# Patient Record
Sex: MALE | Race: OTHER | Employment: PART TIME | URBAN - METROPOLITAN AREA
[De-identification: names, ages, dates, MRNs, and addresses within clinical notes are randomized per-mention and may not be internally consistent; named-entity substitution may affect disease eponyms.]

---

## 2021-03-31 ENCOUNTER — APPOINTMENT (OUTPATIENT)
Dept: CT IMAGING | Age: 46
End: 2021-03-31
Payer: COMMERCIAL

## 2021-03-31 ENCOUNTER — HOSPITAL ENCOUNTER (EMERGENCY)
Age: 46
Discharge: HOME | End: 2021-03-31
Attending: EMERGENCY MEDICINE
Payer: COMMERCIAL

## 2021-03-31 ENCOUNTER — APPOINTMENT (OUTPATIENT)
Dept: GENERAL RADIOLOGY | Age: 46
End: 2021-03-31
Payer: COMMERCIAL

## 2021-03-31 VITALS
HEART RATE: 105 BPM | BODY MASS INDEX: 33.4 KG/M2 | OXYGEN SATURATION: 95 % | RESPIRATION RATE: 21 BRPM | DIASTOLIC BLOOD PRESSURE: 74 MMHG | SYSTOLIC BLOOD PRESSURE: 142 MMHG | WEIGHT: 220.4 LBS | TEMPERATURE: 99.3 F | HEIGHT: 68 IN

## 2021-03-31 DIAGNOSIS — K70.30 ALCOHOLIC CIRRHOSIS OF LIVER WITHOUT ASCITES (HCC): Primary | ICD-10-CM

## 2021-03-31 DIAGNOSIS — D69.6 THROMBOCYTOPENIA (HCC): ICD-10-CM

## 2021-03-31 DIAGNOSIS — S40.021A HEMATOMA OF ARM, RIGHT, INITIAL ENCOUNTER: ICD-10-CM

## 2021-03-31 DIAGNOSIS — K44.9 DIAPHRAGMATIC HERNIA WITHOUT OBSTRUCTION AND WITHOUT GANGRENE: ICD-10-CM

## 2021-03-31 LAB
A/G RATIO: 0.7 (ref 1.1–2.2)
ALBUMIN SERPL-MCNC: 3.4 G/DL (ref 3.4–5)
ALP BLD-CCNC: 220 U/L (ref 40–129)
ALT SERPL-CCNC: 25 U/L (ref 10–40)
ANION GAP SERPL CALCULATED.3IONS-SCNC: 14 MMOL/L (ref 3–16)
AST SERPL-CCNC: 274 U/L (ref 15–37)
BASOPHILS ABSOLUTE: 0.1 K/UL (ref 0–0.2)
BASOPHILS RELATIVE PERCENT: 1.1 %
BILIRUB SERPL-MCNC: 2.3 MG/DL (ref 0–1)
BUN BLDV-MCNC: 6 MG/DL (ref 7–20)
CALCIUM SERPL-MCNC: 8.3 MG/DL (ref 8.3–10.6)
CHLORIDE BLD-SCNC: 102 MMOL/L (ref 99–110)
CO2: 26 MMOL/L (ref 21–32)
CREAT SERPL-MCNC: <0.5 MG/DL (ref 0.9–1.3)
EKG ATRIAL RATE: 98 BPM
EKG DIAGNOSIS: NORMAL
EKG P AXIS: 22 DEGREES
EKG P-R INTERVAL: 154 MS
EKG Q-T INTERVAL: 376 MS
EKG QRS DURATION: 92 MS
EKG QTC CALCULATION (BAZETT): 480 MS
EKG R AXIS: -23 DEGREES
EKG T AXIS: 34 DEGREES
EKG VENTRICULAR RATE: 98 BPM
EOSINOPHILS ABSOLUTE: 0.3 K/UL (ref 0–0.6)
EOSINOPHILS RELATIVE PERCENT: 3.3 %
GFR AFRICAN AMERICAN: >60
GFR NON-AFRICAN AMERICAN: >60
GLOBULIN: 5.1 G/DL
GLUCOSE BLD-MCNC: 177 MG/DL (ref 70–99)
HCT VFR BLD CALC: 37.2 % (ref 40.5–52.5)
HEMOGLOBIN: 12.9 G/DL (ref 13.5–17.5)
LIPASE: 89 U/L (ref 13–60)
LYMPHOCYTES ABSOLUTE: 1.2 K/UL (ref 1–5.1)
LYMPHOCYTES RELATIVE PERCENT: 12.7 %
MAGNESIUM: 1.8 MG/DL (ref 1.8–2.4)
MCH RBC QN AUTO: 34.1 PG (ref 26–34)
MCHC RBC AUTO-ENTMCNC: 34.7 G/DL (ref 31–36)
MCV RBC AUTO: 98.2 FL (ref 80–100)
MONOCYTES ABSOLUTE: 0.7 K/UL (ref 0–1.3)
MONOCYTES RELATIVE PERCENT: 7.5 %
NEUTROPHILS ABSOLUTE: 6.8 K/UL (ref 1.7–7.7)
NEUTROPHILS RELATIVE PERCENT: 75.4 %
PDW BLD-RTO: 15.2 % (ref 12.4–15.4)
PLATELET # BLD: 68 K/UL (ref 135–450)
PLATELET SLIDE REVIEW: ABNORMAL
PMV BLD AUTO: 8.7 FL (ref 5–10.5)
POTASSIUM REFLEX MAGNESIUM: 3 MMOL/L (ref 3.5–5.1)
RBC # BLD: 3.79 M/UL (ref 4.2–5.9)
RBC # BLD: NORMAL 10*6/UL
SLIDE REVIEW: ABNORMAL
SODIUM BLD-SCNC: 142 MMOL/L (ref 136–145)
TOTAL PROTEIN: 8.5 G/DL (ref 6.4–8.2)
TROPONIN: <0.01 NG/ML
WBC # BLD: 9.1 K/UL (ref 4–11)

## 2021-03-31 PROCEDURE — 99283 EMERGENCY DEPT VISIT LOW MDM: CPT

## 2021-03-31 PROCEDURE — 93010 ELECTROCARDIOGRAM REPORT: CPT | Performed by: INTERNAL MEDICINE

## 2021-03-31 PROCEDURE — 83735 ASSAY OF MAGNESIUM: CPT

## 2021-03-31 PROCEDURE — 84484 ASSAY OF TROPONIN QUANT: CPT

## 2021-03-31 PROCEDURE — 6360000004 HC RX CONTRAST MEDICATION: Performed by: EMERGENCY MEDICINE

## 2021-03-31 PROCEDURE — 93005 ELECTROCARDIOGRAM TRACING: CPT | Performed by: EMERGENCY MEDICINE

## 2021-03-31 PROCEDURE — 6360000002 HC RX W HCPCS: Performed by: EMERGENCY MEDICINE

## 2021-03-31 PROCEDURE — 83690 ASSAY OF LIPASE: CPT

## 2021-03-31 PROCEDURE — 71045 X-RAY EXAM CHEST 1 VIEW: CPT

## 2021-03-31 PROCEDURE — 71260 CT THORAX DX C+: CPT

## 2021-03-31 PROCEDURE — 96374 THER/PROPH/DIAG INJ IV PUSH: CPT

## 2021-03-31 PROCEDURE — 80053 COMPREHEN METABOLIC PANEL: CPT

## 2021-03-31 PROCEDURE — 85025 COMPLETE CBC W/AUTO DIFF WBC: CPT

## 2021-03-31 RX ORDER — PROMETHAZINE HYDROCHLORIDE 25 MG/ML
12.5 INJECTION, SOLUTION INTRAMUSCULAR; INTRAVENOUS ONCE
Status: COMPLETED | OUTPATIENT
Start: 2021-03-31 | End: 2021-03-31

## 2021-03-31 RX ORDER — AMLODIPINE BESYLATE 10 MG/1
10 TABLET ORAL DAILY
Status: ON HOLD | COMMUNITY
End: 2022-02-19

## 2021-03-31 RX ORDER — LISINOPRIL 20 MG/1
20 TABLET ORAL DAILY
Status: ON HOLD | COMMUNITY
End: 2022-02-19 | Stop reason: ALTCHOICE

## 2021-03-31 RX ORDER — PROMETHAZINE HYDROCHLORIDE 25 MG/1
25 TABLET ORAL EVERY 6 HOURS PRN
Qty: 20 TABLET | Refills: 0 | Status: SHIPPED | OUTPATIENT
Start: 2021-03-31 | End: 2021-04-07

## 2021-03-31 RX ADMIN — IOPAMIDOL 80 ML: 755 INJECTION, SOLUTION INTRAVENOUS at 02:22

## 2021-03-31 RX ADMIN — PROMETHAZINE HYDROCHLORIDE 12.5 MG: 25 INJECTION INTRAMUSCULAR; INTRAVENOUS at 02:57

## 2021-03-31 ASSESSMENT — PAIN DESCRIPTION - FREQUENCY: FREQUENCY: CONTINUOUS

## 2021-03-31 ASSESSMENT — PAIN DESCRIPTION - LOCATION: LOCATION: CHEST

## 2021-03-31 NOTE — ED PROVIDER NOTES
[address]   eMERGENCY dEPARTMENT eNCOUnter      Pt Name: Roas Brar  MRN: [id number]  [employer] 1975  Date of evaluation: 3/31/2021  Provider: Michael Mcnally MD  PCP: [address] [place] [address]       Chief Complaint   Patient presents with    Chest Pain       HISTORY OFPRESENT ILLNESS   (Location/Symptom, Timing/Onset, Context/Setting, Quality, Duration, Modifying Factors,Severity)  Note limiting factors. Rosa Brar is a 51 y.o. male presents with complaints that for about 3 or 4 days he has had episodes of chest pain that is been constant he describes it as sharp and he gets short of breath with it he denies smoking he does have a history of high blood pressure he does not have diabetes he does not have high cholesterol he does have a family history of cerebrovascular disease in his family he denies any fever denies any pain with deep breath says he just has not been feeling well over the last couple of weeks feeling nauseated he does have a remote history of drinking when he was younger. He says the pain in his chest was sharp and on the left side earlier in the evening and it is much less so now    Nursing Notes were all reviewed and agreed with or any disagreements were addressed  in the HPI. REVIEW OF SYSTEMS    (2-9 systems for level 4, 10 or more for level 5)     Review of Systems    Positives and Pertinent negatives as per HPI. Except as noted above in the ROS, all other systems were reviewed andnegative. PASTMEDICAL HISTORY     Past Medical History:   Diagnosis Date    Hypertension          SURGICAL HISTORY     History reviewed. No pertinent surgical history. CURRENT MEDICATIONS       Previous Medications    AMLODIPINE (NORVASC) 10 MG TABLET    Take 10 mg by mouth daily    LISINOPRIL (PRINIVIL;ZESTRIL) 20 MG TABLET    Take 20 mg by mouth daily       ALLERGIES     Patient has no known allergies.     FAMILY HISTORY     History reviewed. No pertinent family history. SOCIAL HISTORY       Social History     Socioeconomic History    Marital status: Single     Spouse name: None    Number of children: None    Years of education: None    Highest education level: None   Occupational History    None   Social Needs    Financial resource strain: None    Food insecurity     Worry: None     Inability: None    Transportation needs     Medical: None     Non-medical: None   Tobacco Use    Smoking status: Former Smoker    Smokeless tobacco: Never Used   Substance and Sexual Activity    Alcohol use: Yes     Comment: rarely    Drug use: Yes     Types: Marijuana    Sexual activity: Yes     Partners: Female   Lifestyle    Physical activity     Days per week: None     Minutes per session: None    Stress: None   Relationships    Social connections     Talks on phone: None     Gets together: None     Attends Yazdanism service: None     Active member of club or organization: None     Attends meetings of clubs or organizations: None     Relationship status: None    Intimate partner violence     Fear of current or ex partner: None     Emotionally abused: None     Physically abused: None     Forced sexual activity: None   Other Topics Concern    None   Social History Narrative    None       SCREENINGS      @FLOW([id number])@      PHYSICAL EXAM    (up to 7 for level 4, 8 or more for level 5)     ED Triage Vitals [03/31/21 0041]   BP Temp Temp src Pulse Resp SpO2 Height Weight   (!) 141/82 -- -- 103 18 97 % 5' 8\" (1.727 m) 220 lb 6.4 oz (100 kg)       Physical Exam      General Appearance:  Alert, cooperative, no distress, appears stated age. Head:  Normocephalic, without obviousabnormality, atraumatic. Eyes:  conjunctiva/corneas clear, EOM's intact. Sclera anicteric. ENT: Mucous membranes moist.   Neck: Supple, symmetrical, trachea midline, no adenopathy. No jugular venous distention.      Lungs:   Clear to auscultation bilaterally, respirationsunlabored. No rales, rhonchi or wheezes. Chest Wall:  No tenderness. Heart:  Regular rate and rhythm, S1 and S2 normal, no murmur, rub or gallop. Abdomen:   Soft, non-tender, bowel sounds active,   no masses, no organomegaly. Extremities: No edema, cords or calf tenderness. Full range of motion. Pulses: 2+ and symmetric   Skin: Turgor is normal, no rashes or lesions. Neurologic: Alert and oriented X 3. No focal findings.   Motor grossly normal.  Speech clear, no drift, CN III-XII grossly intact,        DIAGNOSTIC RESULTS   LABS:    Labs Reviewed   CBC WITH AUTO DIFFERENTIAL - Abnormal; Notable for the following components:       Result Value    RBC 3.79 (*)     Hemoglobin 12.9 (*)     Hematocrit 37.2 (*)     MCH 34.1 (*)     Platelets 68 (*)     All other components within normal limits    Narrative:     Performed at:  [facility] [place]   Phone [phone]   COMPREHENSIVE METABOLIC PANEL W/ REFLEX TO MG FOR LOW K - Abnormal; Notable for the following components:    Potassium reflex Magnesium 3.0 (*)     Glucose 177 (*)     BUN 6 (*)     CREATININE <0.5 (*)     Total Protein 8.5 (*)     Albumin/Globulin Ratio 0.7 (*)     Total Bilirubin 2.3 (*)     Alkaline Phosphatase 220 (*)      (*)     All other components within normal limits    Narrative:     Performed at:  [address] [facility] [place]   Phone [phone]   LIPASE - Abnormal; Notable for the following components:    Lipase 89.0 (*)     All other components within normal limits    Narrative:     Performed at:  [facility]  [address]   Phone [phone]   TROPONIN    Narrative:     Performed at:  [address] [facility] [place]   Phone [phone]   MAGNESIUM Narrative:     Performed at:  [facility]) [facility]  [address]   Phone [phone]   URINE RT REFLEX TO CULTURE       All other labs were within normal range or not returned as of this dictation. EKG: All EKG's are interpreted by the Emergency Department Physician who eithersigns or Co-signs this chart in the absence of a cardiologist.    The Ekg interpreted by me in the absence of a cardiologist shows. Normal Sinus rhythm   Rate of   98  Axis is   Normal  QTc is  480  Intervals and Durations are unremarkable. Nonspecific ST-T wave changes appreciated. No evidence of acute ischemia. RADIOLOGY:   Non-plain film images such as CT, Ultrasound and MRI are read by the radiologist. Plain radiographic images are visualized by myself. *    Interpretation per the Radiologist below, if available at the time of this note:    CT CHEST ABDOMEN PELVIS W CONTRAST   Final Result   1. Left diaphragmatic hernia containing fat/ omentum. 2.  Suboptimal enhancement of the pulmonary arteries. Cannot assess for    pulmonary emboli/filling defects.       _______________________________________________       IMPRESSION:          Heterogeneous, fatty and nodular cirrhotic liver with numerous too    small to characterize low-attenuation foci. XR CHEST PORTABLE   Final Result     Eventration of the left diaphragm versus opacity/lesion left lower    hemithorax.                 PROCEDURES   Unless otherwise noted below, none     Procedures    *    CRITICAL CARE TIME   N/A      EMERGENCY DEPARTMENT COURSE and DIFFERENTIALDIAGNOSIS/MDM:   Vitals:    Vitals:    03/31/21 0041 03/31/21 0100 03/31/21 0130 03/31/21 0132   BP: (!) 141/82 129/76 136/86    Pulse: 103   99   Resp: 18      Temp: 99.3 °F (37.4 °C)      TempSrc: Oral      SpO2: 97% 93% 97%    Weight: 220 lb 6.4 oz (100 kg)      Height: 5' 8\" (1.727 m)          Patient was given thefollowing medications:  Medications   iopamidol (ISOVUE-370) 76 % injection 80 mL (80 mLs Intravenous Given 3/31/21 0222)   promethazine (PHENERGAN) injection 12.5 mg (12.5 mg Intravenous Given 3/31/21 0257)           The patient tolerated their visit well. The patient and / or the familywere informed of the results of any tests, a time was given to answer questions. FINAL IMPRESSION      1. Alcoholic cirrhosis of liver without ascites ([facility] [address]    2. Thrombocytopenia ([facility] [address]    3. Diaphragmatic hernia without obstruction and without gangrene          DISPOSITION/PLAN   DISPOSITION Decision To Discharge 03/31/2021 03:21:59 AM    The patient had a IV established in the right forearm and initially upon dosing for his CT scan there was infiltration of the IV with some swelling warm compress pressure compress was applied the patient was not having any pain    I reviewed the results with the patient and told him that he does have signs of cirrhosis on his liver he does have a diaphragmatic hernia without any rupture and he does have some thrombocytopenia though not critically so at a level of 68,000 he will follow up with his family doctor Dr. Du Beach for further testing I feel comfortable this patient does not have a life-threatening cause of chest pain    The patient presents with chest pain. The patient has been evaluated and the history and physical exam suggest a benign etiology. I see nothing to suggest coronary ischemia, myocardial infarction, pulmonary embolism, thoracic aortic dissection, significant pericarditis, pneumonia, pneumothorax, or acute abdomen. I feel the patient can be safely discharged to home with outpatient follow up. Instructions have been given for the patient to return to the ED for any worsening of the symptoms, including but not limited to increased pain, shortness of breath, abdominal pain or weakness.     PATIENT REFERRED TO:  [facility]  [address] [facility] [address] [phone]    In 2 days        DISCHARGE MEDICATIONS:  New Prescriptions    PROMETHAZINE (PHENERGAN) 25 MG TABLET    Take 1 tablet by mouth every 6 hours as needed for Nausea WARNING:  May cause drowsiness. May impair ability to operate vehicles or machinery. Do not use in combination with alcohol.        DISCONTINUED MEDICATIONS:  Discontinued Medications    No medications on file              (Please note that portions of this note were completed with a voice recognition program.  Efforts were made to edit the dictations but occasionally words are mis-transcribed.)    Lizzeth Abbasi MD (electronically signed)      Lizzeth Abbasi MD  03/31/21 9414

## 2021-03-31 NOTE — ED NOTES
IV site in rt arm infiltrated in CT site hard and swollen warm compress applied and arm elevated     Emigdio Diaz, RN  03/31/21 0071

## 2021-03-31 NOTE — ED TRIAGE NOTES
Pt states he has had lt sided chest pain he points to his left side below his nipple and says it is tender to touch there states this has been going on for about 3 days but got worse tonight

## 2021-03-31 NOTE — ED NOTES
Patient prepared for and ready to be discharged. Patient discharged at this time in no acute distress after verbalizing understanding of discharge instructions. Patient left after receiving After Visit Summary instructions.         Soham Garcia RN  03/31/21 0962

## 2021-12-22 ENCOUNTER — APPOINTMENT (OUTPATIENT)
Dept: GENERAL RADIOLOGY | Age: 46
End: 2021-12-22
Payer: COMMERCIAL

## 2021-12-22 ENCOUNTER — HOSPITAL ENCOUNTER (EMERGENCY)
Age: 46
Discharge: HOME | End: 2021-12-22
Attending: EMERGENCY MEDICINE
Payer: COMMERCIAL

## 2021-12-22 VITALS
HEART RATE: 70 BPM | WEIGHT: 195.2 LBS | SYSTOLIC BLOOD PRESSURE: 133 MMHG | HEIGHT: 68 IN | TEMPERATURE: 98.5 F | RESPIRATION RATE: 20 BRPM | BODY MASS INDEX: 29.58 KG/M2 | DIASTOLIC BLOOD PRESSURE: 68 MMHG | OXYGEN SATURATION: 99 %

## 2021-12-22 DIAGNOSIS — D69.6 THROMBOCYTOPENIA (HCC): ICD-10-CM

## 2021-12-22 DIAGNOSIS — K70.30 ALCOHOLIC CIRRHOSIS, UNSPECIFIED WHETHER ASCITES PRESENT (HCC): ICD-10-CM

## 2021-12-22 DIAGNOSIS — R23.3 EASY BRUISING: Primary | ICD-10-CM

## 2021-12-22 DIAGNOSIS — E80.6 HYPERBILIRUBINEMIA: ICD-10-CM

## 2021-12-22 LAB
A/G RATIO: 0.7 (ref 1.1–2.2)
ALBUMIN SERPL-MCNC: 3.3 G/DL (ref 3.4–5)
ALP BLD-CCNC: 226 U/L (ref 40–129)
ALT SERPL-CCNC: 39 U/L (ref 10–40)
ANION GAP SERPL CALCULATED.3IONS-SCNC: 12 MMOL/L (ref 3–16)
APTT: 51.5 SEC (ref 26.2–38.6)
AST SERPL-CCNC: 247 U/L (ref 15–37)
BASOPHILS ABSOLUTE: 0 K/UL (ref 0–0.2)
BASOPHILS RELATIVE PERCENT: 0.6 %
BILIRUB SERPL-MCNC: 10.1 MG/DL (ref 0–1)
BUN BLDV-MCNC: 4 MG/DL (ref 7–20)
CALCIUM SERPL-MCNC: 8.9 MG/DL (ref 8.3–10.6)
CHLORIDE BLD-SCNC: 106 MMOL/L (ref 99–110)
CO2: 21 MMOL/L (ref 21–32)
CREAT SERPL-MCNC: <0.5 MG/DL (ref 0.9–1.3)
EOSINOPHILS ABSOLUTE: 0.2 K/UL (ref 0–0.6)
EOSINOPHILS RELATIVE PERCENT: 3.8 %
ETHANOL: 103 MG/DL (ref 0–0.08)
GFR AFRICAN AMERICAN: >60
GFR NON-AFRICAN AMERICAN: >60
GLUCOSE BLD-MCNC: 152 MG/DL (ref 70–99)
HCT VFR BLD CALC: 32.5 % (ref 40.5–52.5)
HEMOGLOBIN: 11.5 G/DL (ref 13.5–17.5)
INR BLD: 2.22 (ref 0.88–1.12)
LYMPHOCYTES ABSOLUTE: 1.2 K/UL (ref 1–5.1)
LYMPHOCYTES RELATIVE PERCENT: 19.4 %
MCH RBC QN AUTO: 37.4 PG (ref 26–34)
MCHC RBC AUTO-ENTMCNC: 35.5 G/DL (ref 31–36)
MCV RBC AUTO: 105.2 FL (ref 80–100)
MONOCYTES ABSOLUTE: 0.5 K/UL (ref 0–1.3)
MONOCYTES RELATIVE PERCENT: 8.4 %
NEUTROPHILS ABSOLUTE: 4.1 K/UL (ref 1.7–7.7)
NEUTROPHILS RELATIVE PERCENT: 67.8 %
PDW BLD-RTO: 14.8 % (ref 12.4–15.4)
PLATELET # BLD: 75 K/UL (ref 135–450)
PMV BLD AUTO: 9.3 FL (ref 5–10.5)
POTASSIUM REFLEX MAGNESIUM: 3.9 MMOL/L (ref 3.5–5.1)
PROTHROMBIN TIME: 25.9 SEC (ref 9.9–12.7)
RBC # BLD: 3.09 M/UL (ref 4.2–5.9)
SODIUM BLD-SCNC: 139 MMOL/L (ref 136–145)
TOTAL PROTEIN: 7.9 G/DL (ref 6.4–8.2)
TROPONIN: <0.01 NG/ML
WBC # BLD: 6 K/UL (ref 4–11)

## 2021-12-22 PROCEDURE — 99284 EMERGENCY DEPT VISIT MOD MDM: CPT

## 2021-12-22 PROCEDURE — 85610 PROTHROMBIN TIME: CPT

## 2021-12-22 PROCEDURE — 84484 ASSAY OF TROPONIN QUANT: CPT

## 2021-12-22 PROCEDURE — 82077 ASSAY SPEC XCP UR&BREATH IA: CPT

## 2021-12-22 PROCEDURE — 85025 COMPLETE CBC W/AUTO DIFF WBC: CPT

## 2021-12-22 PROCEDURE — 71046 X-RAY EXAM CHEST 2 VIEWS: CPT

## 2021-12-22 PROCEDURE — 93005 ELECTROCARDIOGRAM TRACING: CPT | Performed by: EMERGENCY MEDICINE

## 2021-12-22 PROCEDURE — 85730 THROMBOPLASTIN TIME PARTIAL: CPT

## 2021-12-22 PROCEDURE — 80053 COMPREHEN METABOLIC PANEL: CPT

## 2021-12-22 ASSESSMENT — PAIN DESCRIPTION - LOCATION: LOCATION: ARM

## 2021-12-22 ASSESSMENT — PAIN DESCRIPTION - FREQUENCY: FREQUENCY: CONTINUOUS

## 2021-12-22 ASSESSMENT — PAIN SCALES - GENERAL: PAINLEVEL_OUTOF10: 8

## 2021-12-22 ASSESSMENT — PAIN DESCRIPTION - ORIENTATION: ORIENTATION: RIGHT

## 2021-12-22 ASSESSMENT — PAIN DESCRIPTION - PAIN TYPE: TYPE: ACUTE PAIN

## 2021-12-22 ASSESSMENT — PAIN DESCRIPTION - DESCRIPTORS: DESCRIPTORS: SHARP;NUMBNESS;TINGLING

## 2021-12-22 NOTE — ED PROVIDER NOTES
[address] [facility]    CHIEF COMPLAINT  Chest Pain and Arm Pain (bilateral)       HISTORY OF PRESENT ILLNESS  Batool Newell is a 51 y.o. male with PMH alcoholic cirrhosis, and as below, who presents to the ED with complaint of a bruising of the right forearm. Patient attempted to be seen by PCP and by UC but was unable to be seen. The R forearm bruising started yesterday morning. No clear precipitating factor. No trauma. Patient denies antiplatelet or anticoagulating agents. The patient was seen by his PCP on 11/19/2021 for bruising of the left arm and chest. He underwent bloodwork at that time that was notable for thrombocytopenia, a chronic problem for the patient. Tbili 11.4 (Direct bili 4.7); INR 1.7; PTT 17.2. Patient drinking 1 beer / day. The patient complains of occasional chest pain. Last episode occurred \"sometime last week\". Lasted for only 20-30 sec. Poorly characterized. Occasional SOB. Denies nausea, vomiting, diarrhea. I note visit with gastroenterology Dr. Ramiro Lipscomb 6/28/2021 with diagnosis of liver cirrhosis with alcoholic liver cirrhosis favored most likely diagnosis. Signs of portal hypertension seen on CT scan. Recommendations at that time for EGD for evaluation of diarrhea and black tarry stools. Recommendations for referral to Dr. Rhonda Santillan or Dr. Xavier Alejandro  / Victor Hugo Julio and patient advised to follow up for EGD and office visit. Patient has not yet followed with either of these individuals but states he has an appointment with Dr. Xavier Alejandro. No other complaints, modifying factors or associated symptoms. I have reviewed the following from the nursing documentation:    Past Medical History:   Diagnosis Date    Hypertension      History reviewed. No pertinent surgical history. History reviewed. No pertinent family history.   Social History     Socioeconomic History    Marital status: Single     Spouse name: Not on file    Number of children: Not on file  Years of education: Not on file    Highest education level: Not on file   Occupational History    Not on file   Tobacco Use    Smoking status: Former Smoker    Smokeless tobacco: Never Used   Substance and Sexual Activity    Alcohol use: Not Currently    Drug use: Yes     Types: Marijuana Laura Serrano)    Sexual activity: Yes     Partners: Female   Other Topics Concern    Not on file   Social History Narrative    Not on file     Social Determinants of Health     Financial Resource Strain:     Difficulty of Paying Living Expenses: Not on file   Food Insecurity:     Worried About Running Out of Food in the Last Year: Not on file    Felix of Food in the Last Year: Not on file   Transportation Needs:     Lack of Transportation (Medical): Not on file    Lack of Transportation (Non-Medical): Not on file   Physical Activity:     Days of Exercise per Week: Not on file    Minutes of Exercise per Session: Not on file   Stress:     Feeling of Stress : Not on file   Social Connections:     Frequency of Communication with Friends and Family: Not on file    Frequency of Social Gatherings with Friends and Family: Not on file    Attends Zoroastrianism Services: Not on file    Active Member of [address] [place] [address] or Organizations: Not on file    Attends Club or Organization Meetings: Not on file    Marital Status: Not on file   Intimate Partner Violence:     Fear of Current or Ex-Partner: Not on file    Emotionally Abused: Not on file    Physically Abused: Not on file    Sexually Abused: Not on file   Housing Stability:     Unable to Pay for Housing in the Last Year: Not on file    Number of [place] in the Last Year: Not on file    Unstable Housing in the Last Year: Not on file     No current facility-administered medications for this encounter.      Current Outpatient Medications   Medication Sig Dispense Refill    lisinopril (PRINIVIL;ZESTRIL) 20 MG tablet Take 20 mg by mouth daily      amLODIPine (NORVASC) 10 MG tablet Take 10 mg by mouth daily       No Known Allergies    REVIEW OF SYSTEMS  10 systems reviewed, pertinent positives and negatives per HPI, otherwise noted to be negative. PHYSICAL EXAM  ED Triage Vitals [12/22/21 1730]   Enc Vitals Group      /71      Pulse 65      Resp 20      Temp 98.5 °F (36.9 °C)      Temp Source Oral      SpO2 98 %      Weight 195 lb 3.2 oz (88.5 kg)      Height 5' 8\" (1.727 m)      Head Circumference       Peak Flow       Pain Score       Pain Loc       Pain Edu? Excl. in [address]? General appearance: Awake and alert. Cooperative. No acute distress. HENT: Normocephalic. Atraumatic. Mucous membranes are moist.  Neck: Supple. Eyes: PERRL. EOMI. Heart/Chest: RRR. No murmurs. Lungs: Respirations unlabored. CTAB. Good air exchange. Speaking comfortably in full sentences. Abdomen: Soft. Non-tender. Non-distended. No rebound or guarding. Musculoskeletal: There is bruising of the anterior aspect of the right forearm. Otherwise no deformity. No tenderness in the extremities. All extremities neurovascularly intact. Skin: Warm and dry. No acute rashes. Neurological: Alert and oriented. CN II-XII intact. Strength 5/5 bilateral upper and lower extremities. Sensation intact to light touch. Gait normal.  Psychiatric: Mood/affect: normal      LABS  I have reviewed all labs for this visit.    Results for orders placed or performed during the hospital encounter of 12/22/21   CBC Auto Differential   Result Value Ref Range    WBC 6.0 4.0 - 11.0 K/uL    RBC 3.09 (L) 4.20 - 5.90 M/uL    Hemoglobin 11.5 (L) 13.5 - 17.5 g/dL    Hematocrit 32.5 (L) 40.5 - 52.5 %    .2 (H) 80.0 - 100.0 fL    MCH 37.4 (H) 26.0 - 34.0 pg    MCHC 35.5 31.0 - 36.0 g/dL    RDW 14.8 12.4 - 15.4 %    Platelets 75 (L) 960 - 450 K/uL    MPV 9.3 5.0 - 10.5 fL    Neutrophils % 67.8 %    Lymphocytes % 19.4 %    Monocytes % 8.4 %    Eosinophils % 3.8 %    Basophils % 0.6 %    Neutrophils Absolute 4.1 1.7 - 7.7 K/uL    Lymphocytes Absolute 1.2 1.0 - 5.1 K/uL    Monocytes Absolute 0.5 0.0 - 1.3 K/uL    Eosinophils Absolute 0.2 0.0 - 0.6 K/uL    Basophils Absolute 0.0 0.0 - 0.2 K/uL   Comprehensive Metabolic Panel w/ Reflex to MG   Result Value Ref Range    Sodium 139 136 - 145 mmol/L    Potassium reflex Magnesium 3.9 3.5 - 5.1 mmol/L    Chloride 106 99 - 110 mmol/L    CO2 21 21 - 32 mmol/L    Anion Gap 12 3 - 16    Glucose 152 (H) 70 - 99 mg/dL    BUN 4 (L) 7 - 20 mg/dL    CREATININE <0.5 (L) 0.9 - 1.3 mg/dL    GFR Non-African American >60 >60    GFR African American >60 >60    Calcium 8.9 8.3 - 10.6 mg/dL    Total Protein 7.9 6.4 - 8.2 g/dL    Albumin 3.3 (L) 3.4 - 5.0 g/dL    Albumin/Globulin Ratio 0.7 (L) 1.1 - 2.2    Total Bilirubin 10.1 (H) 0.0 - 1.0 mg/dL    Alkaline Phosphatase 226 (H) 40 - 129 U/L    ALT 39 10 - 40 U/L     (H) 15 - 37 U/L   Troponin   Result Value Ref Range    Troponin <0.01 <0.01 ng/mL   Protime-INR   Result Value Ref Range    Protime 25.9 (H) 9.9 - 12.7 sec    INR 2.22 (H) 0.88 - 1.12   APTT   Result Value Ref Range    aPTT 51.5 (H) 26.2 - 38.6 sec   Ethanol   Result Value Ref Range    Ethanol Lvl 103 mg/dL   EKG 12 Lead   Result Value Ref Range    Ventricular Rate 66 BPM    Atrial Rate 66 BPM    P-R Interval 148 ms    QRS Duration 98 ms    Q-T Interval 448 ms    QTc Calculation (Bazett) 469 ms    P Axis 19 degrees    R Axis -26 degrees    T Axis 28 degrees    Diagnosis Normal sinus rhythmNormal ECG        RADIOLOGY  I have reviewed all radiographic studies for this visit. XR CHEST (2 VW)   Final Result      No acute pulmonary disease. ECG  EKG interpreted by myself. Rate: normal  Rhythm: NSR  Axis: normal  Intervals: within normal limits  ST Segments: no acute abnormality  T waves: no acute abnormality  Comparison: Compared to 3/31/21, QTc narrowed by 11 ms  Impression: NSR with no acute abnormality       ED COURSE/MDM  Patient seen and evaluated.  Old records reviewed. Labs and imaging reviewed and results discussed with patient/family to extent possible. This is a 21-year-old male who presents with complaint of easy bruising. He has previously been evaluated for this by his PCP including laboratory studies that showed evidence of synthetic liver dysfunction. Likely related to his history of alcoholic cirrhosis. He is followed with gastroenterology in the outpatient setting and has an upcoming appointment with hepatology specifically. CBC shows thrombocytopenia platelets 75. INR and PTT are both abnormal.  The patient did complain of an atypical chest pain. EKG shows no acute ischemic abnormality. Troponin within normal limits. Chest x-ray nothing acute. Low suspicion for ACS. In the absence of tachycardia, tachypnea, hypoxia, or clinical signs/symptoms of DVT, low suspicion for pulmonary embolism. HEART SCORE:    History: +0 for low suspicion  EKG: +0 for normal EKG   Age: +1 for age 44-72 years  Risk factors (includes HLD, HTN, DM, tobacco use, obesity, and +FHx): +2 for known CAD or 3+ risk factors  Initial troponin: +0 for negative troponin    Heart score: 3. This falls under the following category: Score of 0-3, which indicates a very low risk for major adverse cardiac event and supports early discharge    Pt given strict guidance to abstain from EtOH. States only drinks 1 beer daily but EtOH level here > 100. Close f/u with Hepatology in the outpatient setting. F/u with PCP in several days. Usual strict return precautions for new or worsening symptoms communicated. All questions were answered and the patient/family expressed understanding and agreement with the plan. PROCEDURES  None    CRITICAL CARE  N/A    CLINICAL IMPRESSION  1. Easy bruising    2. Thrombocytopenia ([address]    3. Alcoholic cirrhosis, unspecified whether ascites present ([address]    4.  Hyperbilirubinemia        DISPOSITION   discharge     Anne Smith MD    Note: This chart was created using voice recognition dictation software. Efforts were made by me to ensure accuracy, however some errors may be present due to limitations of this technology and occasionally words are not transcribed correctly.         Tyson Cantor MD  12/23/21 6441

## 2021-12-22 NOTE — ED TRIAGE NOTES
51y/o male presents to the ED with bilateral arm bruising and pain, R>L. +swelling to rt arm. +severe pain. +c/p +sob.  +nausea

## 2021-12-23 LAB
EKG ATRIAL RATE: 66 BPM
EKG DIAGNOSIS: NORMAL
EKG P AXIS: 19 DEGREES
EKG P-R INTERVAL: 148 MS
EKG Q-T INTERVAL: 448 MS
EKG QRS DURATION: 98 MS
EKG QTC CALCULATION (BAZETT): 469 MS
EKG R AXIS: -26 DEGREES
EKG T AXIS: 28 DEGREES
EKG VENTRICULAR RATE: 66 BPM

## 2021-12-23 PROCEDURE — 93010 ELECTROCARDIOGRAM REPORT: CPT | Performed by: INTERNAL MEDICINE

## 2022-02-18 ENCOUNTER — HOSPITAL ENCOUNTER (INPATIENT)
Age: 47
LOS: 4 days | Discharge: HOSPICE | DRG: 661 | End: 2022-02-22
Attending: EMERGENCY MEDICINE | Admitting: INTERNAL MEDICINE
Payer: COMMERCIAL

## 2022-02-18 ENCOUNTER — APPOINTMENT (OUTPATIENT)
Dept: CT IMAGING | Age: 47
DRG: 661 | End: 2022-02-18
Payer: COMMERCIAL

## 2022-02-18 DIAGNOSIS — R17 ELEVATED BILIRUBIN: ICD-10-CM

## 2022-02-18 DIAGNOSIS — E11.9 DIABETES MELLITUS, NEW ONSET (HCC): ICD-10-CM

## 2022-02-18 DIAGNOSIS — R58 ECCHYMOSIS: ICD-10-CM

## 2022-02-18 DIAGNOSIS — D64.9 ANEMIA, UNSPECIFIED TYPE: Primary | ICD-10-CM

## 2022-02-18 DIAGNOSIS — D69.6 THROMBOCYTOPENIA (HCC): ICD-10-CM

## 2022-02-18 DIAGNOSIS — K70.30 ALCOHOLIC CIRRHOSIS, UNSPECIFIED WHETHER ASCITES PRESENT (HCC): ICD-10-CM

## 2022-02-18 LAB
A/G RATIO: 0.9 (ref 1.1–2.2)
ABO/RH: NORMAL
ALBUMIN SERPL-MCNC: 3 G/DL (ref 3.4–5)
ALP BLD-CCNC: 139 U/L (ref 40–129)
ALT SERPL-CCNC: 54 U/L (ref 10–40)
AMMONIA: 65 UMOL/L (ref 16–60)
ANION GAP SERPL CALCULATED.3IONS-SCNC: 13 MMOL/L (ref 3–16)
ANISOCYTOSIS: ABNORMAL
ANTIBODY SCREEN: NORMAL
AST SERPL-CCNC: 164 U/L (ref 15–37)
BASOPHILS ABSOLUTE: 0 K/UL (ref 0–0.2)
BASOPHILS RELATIVE PERCENT: 0.2 %
BILIRUB SERPL-MCNC: 26.5 MG/DL (ref 0–1)
BILIRUBIN URINE: ABNORMAL
BLOOD, URINE: ABNORMAL
BUN BLDV-MCNC: 14 MG/DL (ref 7–20)
BURR CELLS: ABNORMAL
CALCIUM SERPL-MCNC: 8.8 MG/DL (ref 8.3–10.6)
CHLORIDE BLD-SCNC: 101 MMOL/L (ref 99–110)
CLARITY: CLEAR
CO2: 19 MMOL/L (ref 21–32)
COLOR: YELLOW
CREAT SERPL-MCNC: <0.5 MG/DL (ref 0.9–1.3)
CRENATED RBC'S: ABNORMAL
EOSINOPHILS ABSOLUTE: 0 K/UL (ref 0–0.6)
EOSINOPHILS RELATIVE PERCENT: 0.2 %
EPITHELIAL CELLS, UA: NORMAL /HPF (ref 0–5)
ETHANOL: NORMAL MG/DL (ref 0–0.08)
GFR AFRICAN AMERICAN: >60
GFR NON-AFRICAN AMERICAN: >60
GLUCOSE BLD-MCNC: 392 MG/DL (ref 70–99)
GLUCOSE URINE: >=1000 MG/DL
HCT VFR BLD CALC: 20.1 % (ref 40.5–52.5)
HEMATOLOGY PATH CONSULT: YES
HEMOGLOBIN: 6.9 G/DL (ref 13.5–17.5)
HYPOCHROMIA: ABNORMAL
INR BLD: 4.18 (ref 0.88–1.12)
KETONES, URINE: NEGATIVE MG/DL
LEUKOCYTE ESTERASE, URINE: NEGATIVE
LIPASE: 105 U/L (ref 13–60)
LYMPHOCYTES ABSOLUTE: 0.5 K/UL (ref 1–5.1)
LYMPHOCYTES RELATIVE PERCENT: 3.7 %
MAGNESIUM: 1.9 MG/DL (ref 1.8–2.4)
MCH RBC QN AUTO: 36.1 PG (ref 26–34)
MCHC RBC AUTO-ENTMCNC: 34.4 G/DL (ref 31–36)
MCV RBC AUTO: 104.8 FL (ref 80–100)
MICROSCOPIC EXAMINATION: YES
MONOCYTES ABSOLUTE: 1.1 K/UL (ref 0–1.3)
MONOCYTES RELATIVE PERCENT: 9.3 %
NEUTROPHILS ABSOLUTE: 10.6 K/UL (ref 1.7–7.7)
NEUTROPHILS RELATIVE PERCENT: 86.6 %
NITRITE, URINE: NEGATIVE
PDW BLD-RTO: 25.8 % (ref 12.4–15.4)
PH UA: 6 (ref 5–8)
PLATELET # BLD: 47 K/UL (ref 135–450)
PLATELET SLIDE REVIEW: ABNORMAL
PMV BLD AUTO: 8.7 FL (ref 5–10.5)
POIKILOCYTES: ABNORMAL
POLYCHROMASIA: ABNORMAL
POTASSIUM SERPL-SCNC: 4.8 MMOL/L (ref 3.5–5.1)
PRO-BNP: 83 PG/ML (ref 0–124)
PROTEIN UA: NEGATIVE MG/DL
PROTHROMBIN TIME: 50.1 SEC (ref 9.9–12.7)
RBC # BLD: 1.91 M/UL (ref 4.2–5.9)
RBC UA: NORMAL /HPF (ref 0–4)
SCHISTOCYTES: ABNORMAL
SLIDE REVIEW: ABNORMAL
SODIUM BLD-SCNC: 133 MMOL/L (ref 136–145)
SPECIFIC GRAVITY UA: 1.01 (ref 1–1.03)
TOTAL PROTEIN: 6.4 G/DL (ref 6.4–8.2)
TOXIC GRANULATION: PRESENT
TROPONIN: <0.01 NG/ML
URINE TYPE: ABNORMAL
UROBILINOGEN, URINE: 0.2 E.U./DL
WBC # BLD: 12.3 K/UL (ref 4–11)
WBC UA: NORMAL /HPF (ref 0–5)

## 2022-02-18 PROCEDURE — 83735 ASSAY OF MAGNESIUM: CPT

## 2022-02-18 PROCEDURE — 6360000004 HC RX CONTRAST MEDICATION: Performed by: EMERGENCY MEDICINE

## 2022-02-18 PROCEDURE — 86850 RBC ANTIBODY SCREEN: CPT

## 2022-02-18 PROCEDURE — 36430 TRANSFUSION BLD/BLD COMPNT: CPT

## 2022-02-18 PROCEDURE — P9017 PLASMA 1 DONOR FRZ W/IN 8 HR: HCPCS

## 2022-02-18 PROCEDURE — 96374 THER/PROPH/DIAG INJ IV PUSH: CPT

## 2022-02-18 PROCEDURE — 81001 URINALYSIS AUTO W/SCOPE: CPT

## 2022-02-18 PROCEDURE — 99284 EMERGENCY DEPT VISIT MOD MDM: CPT

## 2022-02-18 PROCEDURE — 83036 HEMOGLOBIN GLYCOSYLATED A1C: CPT

## 2022-02-18 PROCEDURE — 85025 COMPLETE CBC W/AUTO DIFF WBC: CPT

## 2022-02-18 PROCEDURE — P9035 PLATELET PHERES LEUKOREDUCED: HCPCS

## 2022-02-18 PROCEDURE — 86900 BLOOD TYPING SEROLOGIC ABO: CPT

## 2022-02-18 PROCEDURE — 82140 ASSAY OF AMMONIA: CPT

## 2022-02-18 PROCEDURE — 86923 COMPATIBILITY TEST ELECTRIC: CPT

## 2022-02-18 PROCEDURE — 80053 COMPREHEN METABOLIC PANEL: CPT

## 2022-02-18 PROCEDURE — P9016 RBC LEUKOCYTES REDUCED: HCPCS

## 2022-02-18 PROCEDURE — 86901 BLOOD TYPING SEROLOGIC RH(D): CPT

## 2022-02-18 PROCEDURE — 6360000002 HC RX W HCPCS: Performed by: EMERGENCY MEDICINE

## 2022-02-18 PROCEDURE — 2580000003 HC RX 258: Performed by: EMERGENCY MEDICINE

## 2022-02-18 PROCEDURE — 71260 CT THORAX DX C+: CPT

## 2022-02-18 PROCEDURE — 96372 THER/PROPH/DIAG INJ SC/IM: CPT

## 2022-02-18 PROCEDURE — 84484 ASSAY OF TROPONIN QUANT: CPT

## 2022-02-18 PROCEDURE — 82077 ASSAY SPEC XCP UR&BREATH IA: CPT

## 2022-02-18 PROCEDURE — 93005 ELECTROCARDIOGRAM TRACING: CPT | Performed by: EMERGENCY MEDICINE

## 2022-02-18 PROCEDURE — 96375 TX/PRO/DX INJ NEW DRUG ADDON: CPT

## 2022-02-18 PROCEDURE — 1200000000 HC SEMI PRIVATE

## 2022-02-18 PROCEDURE — 83690 ASSAY OF LIPASE: CPT

## 2022-02-18 PROCEDURE — 85610 PROTHROMBIN TIME: CPT

## 2022-02-18 PROCEDURE — 6360000002 HC RX W HCPCS: Performed by: NURSE PRACTITIONER

## 2022-02-18 PROCEDURE — 83880 ASSAY OF NATRIURETIC PEPTIDE: CPT

## 2022-02-18 RX ORDER — SODIUM CHLORIDE 9 MG/ML
INJECTION, SOLUTION INTRAVENOUS PRN
Status: DISCONTINUED | OUTPATIENT
Start: 2022-02-18 | End: 2022-02-22 | Stop reason: HOSPADM

## 2022-02-18 RX ORDER — 0.9 % SODIUM CHLORIDE 0.9 %
500 INTRAVENOUS SOLUTION INTRAVENOUS ONCE
Status: COMPLETED | OUTPATIENT
Start: 2022-02-18 | End: 2022-02-18

## 2022-02-18 RX ORDER — PHYTONADIONE 10 MG/ML
10 INJECTION, EMULSION INTRAMUSCULAR; INTRAVENOUS; SUBCUTANEOUS ONCE
Status: COMPLETED | OUTPATIENT
Start: 2022-02-18 | End: 2022-02-18

## 2022-02-18 RX ORDER — ONDANSETRON 2 MG/ML
4 INJECTION INTRAMUSCULAR; INTRAVENOUS ONCE
Status: COMPLETED | OUTPATIENT
Start: 2022-02-18 | End: 2022-02-18

## 2022-02-18 RX ORDER — MORPHINE SULFATE 4 MG/ML
4 INJECTION, SOLUTION INTRAMUSCULAR; INTRAVENOUS ONCE
Status: COMPLETED | OUTPATIENT
Start: 2022-02-18 | End: 2022-02-18

## 2022-02-18 RX ADMIN — SODIUM CHLORIDE 500 ML: 9 INJECTION, SOLUTION INTRAVENOUS at 19:24

## 2022-02-18 RX ADMIN — PHYTONADIONE 10 MG: 10 INJECTION, EMULSION INTRAMUSCULAR; INTRAVENOUS; SUBCUTANEOUS at 19:44

## 2022-02-18 RX ADMIN — ONDANSETRON 4 MG: 2 INJECTION INTRAMUSCULAR; INTRAVENOUS at 19:25

## 2022-02-18 RX ADMIN — IOPAMIDOL 75 ML: 755 INJECTION, SOLUTION INTRAVENOUS at 20:42

## 2022-02-18 RX ADMIN — MORPHINE SULFATE 4 MG: 4 INJECTION INTRAVENOUS at 19:25

## 2022-02-18 ASSESSMENT — PAIN SCALES - GENERAL
PAINLEVEL_OUTOF10: 0
PAINLEVEL_OUTOF10: 9

## 2022-02-18 NOTE — ED PROVIDER NOTES
I independently performed a history and physical on Yazan Apodaca. All diagnostic, treatment, and disposition decisions were made by myself in conjunction with the advanced practice provider. I personally saw the patient and performed a substantive portion of the visit including all aspects of the medical decision making. For further details of [facility] emergency department encounter, please see Tiny Alfonso NP's documentation. Patient is a 79-year-old male presenting today due to concern for extensive bruising to his left side of his chest and abdomen along with extending to the left side of his back and also complaining of left arm swelling. He does have a history of cirrhosis due to alcoholism. He reports normally having some swelling in the legs but does state this is also worse recently. He is still able to urinate. He denies any deep chest pain but does have chest pain associated with the muscles of his chest. He denies any vomiting or diarrhea. He has needed a blood transfusion in the past. He denies any known trauma to the left side of his chest that would have caused the bruising. He denies any fever. Physical:   Gen: No acute distress. AOx3.    Skin: Jaundiced throughout entire body but significant bruising noted to left side of chest and abdomen and extending to the left side of back  Psych: Normal mood and affect  HEENT: NCAT, MMM, scleral icterus  Neck: supple  Cardiac: RRR, pulses 2+ in upper extremities, 2+ pitting edema to bilateral lower extremities and 3+ pitting edema to left upper extremity  Lungs: C2AB, no R/R/W  Chest: Tenderness to palpation to left pectoral region  Abdomen: soft and mild generalized tenderness with no R/G, mild distention  Neuro: no focal neuro deficits with strength and sensation 5/5 in all 4 extremities, GCS equals 15    The Ekg interpreted by me shows  normal sinus rhythm with a rate of 86  Axis is   Normal  QTc is  normal  Intervals and Durations are unremarkable. ST Segments: no acute change and normal  No significant change from prior EKG dated - 12/22/21  No STEMI     Critical Care Time:    I personally saw the patient and independently provided 20 minutes of non-concurrent critical care out of a total shared critical care time provided. Includes repeat examinations, lab interpretation, charting, treating for acute blood loss anemia along with severe thrombocytopenia requiring blood product transfusions  Excludes separate billable procedures. Patient at risk for serious decompensation if not treated for this life-threatening condition. MDM: Patient was evaluated due to concern for bruising to the left side of his chest and abdomen and concern for an abnormal lab result which is most likely related to his hemoglobin. He did ultimately need a blood transfusion. We did speak to gastroenterology at [address] [place] [address] and at this point they reportedly did not have beds but did recommend giving a blood transfusion and a platelet transfusion. We also ultimately gave vitamin K. He will need further evaluation in the hospital and potentially may need transfer to  once his bleeding stabilizes. He was in no acute distress at time of admission and felt comfortable with this plan.      Lawyer Alysha MD  02/19/22 [id number]

## 2022-02-19 PROBLEM — R74.8 ELEVATED LIPASE: Status: ACTIVE | Noted: 2022-02-19

## 2022-02-19 PROBLEM — K70.31 ASCITES DUE TO ALCOHOLIC CIRRHOSIS (HCC): Status: ACTIVE | Noted: 2022-02-19

## 2022-02-19 PROBLEM — K76.6 PORTAL HYPERTENSION (HCC): Status: ACTIVE | Noted: 2022-02-19

## 2022-02-19 PROBLEM — F10.10 ALCOHOL ABUSE: Status: ACTIVE | Noted: 2022-02-19

## 2022-02-19 PROBLEM — R16.1 SPLENOMEGALY: Status: ACTIVE | Noted: 2022-02-19

## 2022-02-19 PROBLEM — R74.8 ELEVATED ALKALINE PHOSPHATASE LEVEL: Status: ACTIVE | Noted: 2022-02-19

## 2022-02-19 PROBLEM — R74.01 TRANSAMINITIS: Status: ACTIVE | Noted: 2022-02-19

## 2022-02-19 PROBLEM — E80.6 HYPERBILIRUBINEMIA: Status: ACTIVE | Noted: 2022-02-19

## 2022-02-19 PROBLEM — K70.30 ALCOHOLIC CIRRHOSIS OF LIVER (HCC): Status: ACTIVE | Noted: 2022-02-19

## 2022-02-19 PROBLEM — R29.818 SUSPECTED SLEEP APNEA: Status: ACTIVE | Noted: 2022-02-19

## 2022-02-19 PROBLEM — E72.20 HYPERAMMONEMIA (HCC): Status: ACTIVE | Noted: 2022-02-19

## 2022-02-19 PROBLEM — I10 ESSENTIAL HYPERTENSION: Status: ACTIVE | Noted: 2022-02-19

## 2022-02-19 PROBLEM — N62 GYNECOMASTIA, MALE: Status: ACTIVE | Noted: 2022-02-19

## 2022-02-19 PROBLEM — J98.11 COMPRESSION ATELECTASIS: Status: ACTIVE | Noted: 2022-02-19

## 2022-02-19 PROBLEM — E88.09 HYPOALBUMINEMIA: Status: ACTIVE | Noted: 2022-02-19

## 2022-02-19 PROBLEM — E66.9 CLASS 2 OBESITY IN ADULT: Status: ACTIVE | Noted: 2022-02-19

## 2022-02-19 PROBLEM — D68.9 COAGULOPATHY (HCC): Status: ACTIVE | Noted: 2022-02-19

## 2022-02-19 PROBLEM — D69.6 THROMBOCYTOPENIA (HCC): Status: ACTIVE | Noted: 2022-02-19

## 2022-02-19 PROBLEM — J90 PLEURAL EFFUSION ON LEFT: Status: ACTIVE | Noted: 2022-02-19

## 2022-02-19 PROBLEM — E11.9 NEW ONSET TYPE 2 DIABETES MELLITUS (HCC): Status: ACTIVE | Noted: 2022-02-19

## 2022-02-19 LAB
A/G RATIO: 0.8 (ref 1.1–2.2)
ACANTHOCYTES: ABNORMAL
ALBUMIN SERPL-MCNC: 2.6 G/DL (ref 3.4–5)
ALP BLD-CCNC: 129 U/L (ref 40–129)
ALT SERPL-CCNC: 48 U/L (ref 10–40)
AMMONIA: 59 UMOL/L (ref 16–60)
ANION GAP SERPL CALCULATED.3IONS-SCNC: 11 MMOL/L (ref 3–16)
ANISOCYTOSIS: ABNORMAL
AST SERPL-CCNC: 158 U/L (ref 15–37)
BASOPHILS ABSOLUTE: 0.1 K/UL (ref 0–0.2)
BASOPHILS RELATIVE PERCENT: 0.5 %
BILIRUB SERPL-MCNC: 27.1 MG/DL (ref 0–1)
BUN BLDV-MCNC: 15 MG/DL (ref 7–20)
BURR CELLS: ABNORMAL
CALCIUM SERPL-MCNC: 8.6 MG/DL (ref 8.3–10.6)
CHLORIDE BLD-SCNC: 103 MMOL/L (ref 99–110)
CO2: 22 MMOL/L (ref 21–32)
CREAT SERPL-MCNC: <0.5 MG/DL (ref 0.9–1.3)
EKG ATRIAL RATE: 86 BPM
EKG DIAGNOSIS: NORMAL
EKG P AXIS: 28 DEGREES
EKG P-R INTERVAL: 142 MS
EKG Q-T INTERVAL: 396 MS
EKG QRS DURATION: 92 MS
EKG QTC CALCULATION (BAZETT): 473 MS
EKG R AXIS: 0 DEGREES
EKG T AXIS: 25 DEGREES
EKG VENTRICULAR RATE: 86 BPM
EOSINOPHILS ABSOLUTE: 0.2 K/UL (ref 0–0.6)
EOSINOPHILS RELATIVE PERCENT: 0.9 %
FERRITIN: 1184 NG/ML (ref 30–400)
FOLATE: 7.65 NG/ML (ref 4.78–24.2)
GFR AFRICAN AMERICAN: >60
GFR NON-AFRICAN AMERICAN: >60
GLUCOSE BLD-MCNC: 214 MG/DL (ref 70–99)
GLUCOSE BLD-MCNC: 226 MG/DL (ref 70–99)
GLUCOSE BLD-MCNC: 227 MG/DL (ref 70–99)
GLUCOSE BLD-MCNC: 231 MG/DL (ref 70–99)
GLUCOSE BLD-MCNC: 251 MG/DL (ref 70–99)
HCT VFR BLD CALC: 22.1 % (ref 40.5–52.5)
HEMATOLOGY PATH CONSULT: NO
HEMOGLOBIN: 7.8 G/DL (ref 13.5–17.5)
IMMATURE RETIC FRACT: 0.57 (ref 0.21–0.37)
IRON SATURATION: ABNORMAL % (ref 20–50)
IRON: 170 UG/DL (ref 59–158)
LYMPHOCYTES ABSOLUTE: 0.6 K/UL (ref 1–5.1)
LYMPHOCYTES RELATIVE PERCENT: 3.7 %
MACROCYTES: ABNORMAL
MCH RBC QN AUTO: 36.8 PG (ref 26–34)
MCHC RBC AUTO-ENTMCNC: 35.4 G/DL (ref 31–36)
MCV RBC AUTO: 103.7 FL (ref 80–100)
MICROCYTES: ABNORMAL
MONOCYTES ABSOLUTE: 2 K/UL (ref 0–1.3)
MONOCYTES RELATIVE PERCENT: 11.9 %
NEUTROPHILS ABSOLUTE: 14 K/UL (ref 1.7–7.7)
NEUTROPHILS RELATIVE PERCENT: 83 %
PDW BLD-RTO: 27.2 % (ref 12.4–15.4)
PERFORMED ON: ABNORMAL
PLATELET # BLD: 54 K/UL (ref 135–450)
PMV BLD AUTO: 9.7 FL (ref 5–10.5)
POIKILOCYTES: ABNORMAL
POLYCHROMASIA: ABNORMAL
POTASSIUM REFLEX MAGNESIUM: 4.3 MMOL/L (ref 3.5–5.1)
PROCALCITONIN: 0.19 NG/ML (ref 0–0.15)
RBC # BLD: 2.13 M/UL (ref 4.2–5.9)
RETICULOCYTE ABSOLUTE COUNT: 0.28 M/UL
RETICULOCYTE COUNT PCT: 12.89 % (ref 0.5–2.18)
SARS-COV-2, NAAT: NOT DETECTED
SCHISTOCYTES: ABNORMAL
SODIUM BLD-SCNC: 136 MMOL/L (ref 136–145)
TARGET CELLS: ABNORMAL
TOTAL IRON BINDING CAPACITY: ABNORMAL UG/DL (ref 260–445)
TOTAL PROTEIN: 5.9 G/DL (ref 6.4–8.2)
VITAMIN B-12: >2000 PG/ML (ref 211–911)
WBC # BLD: 17.5 K/UL (ref 4–11)

## 2022-02-19 PROCEDURE — 93010 ELECTROCARDIOGRAM REPORT: CPT | Performed by: INTERNAL MEDICINE

## 2022-02-19 PROCEDURE — 82607 VITAMIN B-12: CPT

## 2022-02-19 PROCEDURE — 1200000000 HC SEMI PRIVATE

## 2022-02-19 PROCEDURE — G0328 FECAL BLOOD SCRN IMMUNOASSAY: HCPCS

## 2022-02-19 PROCEDURE — 82746 ASSAY OF FOLIC ACID SERUM: CPT

## 2022-02-19 PROCEDURE — 87040 BLOOD CULTURE FOR BACTERIA: CPT

## 2022-02-19 PROCEDURE — 6370000000 HC RX 637 (ALT 250 FOR IP): Performed by: INTERNAL MEDICINE

## 2022-02-19 PROCEDURE — 36415 COLL VENOUS BLD VENIPUNCTURE: CPT

## 2022-02-19 PROCEDURE — 85045 AUTOMATED RETICULOCYTE COUNT: CPT

## 2022-02-19 PROCEDURE — 83550 IRON BINDING TEST: CPT

## 2022-02-19 PROCEDURE — C9113 INJ PANTOPRAZOLE SODIUM, VIA: HCPCS | Performed by: INTERNAL MEDICINE

## 2022-02-19 PROCEDURE — 83540 ASSAY OF IRON: CPT

## 2022-02-19 PROCEDURE — 2580000003 HC RX 258: Performed by: INTERNAL MEDICINE

## 2022-02-19 PROCEDURE — 6360000002 HC RX W HCPCS

## 2022-02-19 PROCEDURE — 85025 COMPLETE CBC W/AUTO DIFF WBC: CPT

## 2022-02-19 PROCEDURE — 6360000002 HC RX W HCPCS: Performed by: INTERNAL MEDICINE

## 2022-02-19 PROCEDURE — 84145 PROCALCITONIN (PCT): CPT

## 2022-02-19 PROCEDURE — 82140 ASSAY OF AMMONIA: CPT

## 2022-02-19 PROCEDURE — 80053 COMPREHEN METABOLIC PANEL: CPT

## 2022-02-19 PROCEDURE — 84238 ASSAY NONENDOCRINE RECEPTOR: CPT

## 2022-02-19 PROCEDURE — 82728 ASSAY OF FERRITIN: CPT

## 2022-02-19 PROCEDURE — 99254 IP/OBS CNSLTJ NEW/EST MOD 60: CPT | Performed by: INTERNAL MEDICINE

## 2022-02-19 PROCEDURE — 87635 SARS-COV-2 COVID-19 AMP PRB: CPT

## 2022-02-19 RX ORDER — DEXTROSE MONOHYDRATE 25 G/50ML
12.5 INJECTION, SOLUTION INTRAVENOUS PRN
Status: DISCONTINUED | OUTPATIENT
Start: 2022-02-19 | End: 2022-02-19

## 2022-02-19 RX ORDER — ACETAMINOPHEN 325 MG/1
650 TABLET ORAL EVERY 4 HOURS PRN
Status: DISCONTINUED | OUTPATIENT
Start: 2022-02-19 | End: 2022-02-22 | Stop reason: HOSPADM

## 2022-02-19 RX ORDER — HYDROXYZINE PAMOATE 50 MG/1
50 CAPSULE ORAL 3 TIMES DAILY PRN
COMMUNITY

## 2022-02-19 RX ORDER — MORPHINE SULFATE 2 MG/ML
INJECTION, SOLUTION INTRAMUSCULAR; INTRAVENOUS
Status: COMPLETED
Start: 2022-02-19 | End: 2022-02-19

## 2022-02-19 RX ORDER — NICOTINE POLACRILEX 4 MG
15 LOZENGE BUCCAL PRN
Status: DISCONTINUED | OUTPATIENT
Start: 2022-02-19 | End: 2022-02-22 | Stop reason: HOSPADM

## 2022-02-19 RX ORDER — PHYTONADIONE 5 MG/1
10 TABLET ORAL ONCE
Status: COMPLETED | OUTPATIENT
Start: 2022-02-19 | End: 2022-02-19

## 2022-02-19 RX ORDER — TOPIRAMATE 25 MG/1
50 TABLET ORAL 2 TIMES DAILY
Status: ON HOLD | COMMUNITY
End: 2022-02-22 | Stop reason: HOSPADM

## 2022-02-19 RX ORDER — SODIUM CHLORIDE 0.9 % (FLUSH) 0.9 %
10 SYRINGE (ML) INJECTION PRN
Status: DISCONTINUED | OUTPATIENT
Start: 2022-02-19 | End: 2022-02-22 | Stop reason: HOSPADM

## 2022-02-19 RX ORDER — INSULIN GLARGINE 100 [IU]/ML
0.25 INJECTION, SOLUTION SUBCUTANEOUS NIGHTLY
Status: DISCONTINUED | OUTPATIENT
Start: 2022-02-19 | End: 2022-02-22 | Stop reason: HOSPADM

## 2022-02-19 RX ORDER — MORPHINE SULFATE 2 MG/ML
2 INJECTION, SOLUTION INTRAMUSCULAR; INTRAVENOUS ONCE
Status: COMPLETED | OUTPATIENT
Start: 2022-02-19 | End: 2022-02-19

## 2022-02-19 RX ORDER — LISINOPRIL 20 MG/1
20 TABLET ORAL DAILY
Status: DISCONTINUED | OUTPATIENT
Start: 2022-02-19 | End: 2022-02-19

## 2022-02-19 RX ORDER — SODIUM CHLORIDE 0.9 % (FLUSH) 0.9 %
10 SYRINGE (ML) INJECTION EVERY 12 HOURS SCHEDULED
Status: DISCONTINUED | OUTPATIENT
Start: 2022-02-19 | End: 2022-02-22 | Stop reason: HOSPADM

## 2022-02-19 RX ORDER — POLYETHYLENE GLYCOL 3350 17 G/17G
17 POWDER, FOR SOLUTION ORAL DAILY PRN
Status: DISCONTINUED | OUTPATIENT
Start: 2022-02-19 | End: 2022-02-22 | Stop reason: HOSPADM

## 2022-02-19 RX ORDER — LACTULOSE 10 G/15ML
30 SOLUTION ORAL 3 TIMES DAILY
Status: ON HOLD | COMMUNITY
End: 2022-02-22 | Stop reason: HOSPADM

## 2022-02-19 RX ORDER — MORPHINE SULFATE 2 MG/ML
2 INJECTION, SOLUTION INTRAMUSCULAR; INTRAVENOUS EVERY 6 HOURS PRN
Status: DISCONTINUED | OUTPATIENT
Start: 2022-02-19 | End: 2022-02-20

## 2022-02-19 RX ORDER — M-VIT,TX,IRON,MINS/CALC/FOLIC 27MG-0.4MG
1 TABLET ORAL DAILY
COMMUNITY

## 2022-02-19 RX ORDER — FLUOXETINE HYDROCHLORIDE 20 MG/1
40 CAPSULE ORAL DAILY
COMMUNITY

## 2022-02-19 RX ORDER — SACCHAROMYCES BOULARDII 250 MG
250 CAPSULE ORAL 2 TIMES DAILY
COMMUNITY

## 2022-02-19 RX ORDER — MENTHOL/CAMPHR/ANTIARTHRITIC 1 11 %-11 %
CREAM (ML) TOPICAL
Status: ON HOLD | COMMUNITY
End: 2022-02-22 | Stop reason: HOSPADM

## 2022-02-19 RX ORDER — POTASSIUM CHLORIDE 20 MEQ/1
20 TABLET, EXTENDED RELEASE ORAL DAILY
Status: ON HOLD | COMMUNITY
End: 2022-02-22 | Stop reason: HOSPADM

## 2022-02-19 RX ORDER — ACETAMINOPHEN 650 MG/1
650 SUPPOSITORY RECTAL EVERY 4 HOURS PRN
Status: DISCONTINUED | OUTPATIENT
Start: 2022-02-19 | End: 2022-02-22 | Stop reason: HOSPADM

## 2022-02-19 RX ORDER — SILDENAFIL 50 MG/1
50 TABLET, FILM COATED ORAL PRN
Status: ON HOLD | COMMUNITY
End: 2022-02-22 | Stop reason: HOSPADM

## 2022-02-19 RX ORDER — PREDNISOLONE 15 MG/5ML
40 SOLUTION ORAL DAILY
Status: ON HOLD | COMMUNITY
End: 2022-02-22 | Stop reason: HOSPADM

## 2022-02-19 RX ORDER — SODIUM CHLORIDE 9 MG/ML
25 INJECTION, SOLUTION INTRAVENOUS PRN
Status: DISCONTINUED | OUTPATIENT
Start: 2022-02-19 | End: 2022-02-22 | Stop reason: HOSPADM

## 2022-02-19 RX ORDER — PANTOPRAZOLE SODIUM 40 MG/10ML
40 INJECTION, POWDER, LYOPHILIZED, FOR SOLUTION INTRAVENOUS DAILY
Status: DISCONTINUED | OUTPATIENT
Start: 2022-02-19 | End: 2022-02-22 | Stop reason: HOSPADM

## 2022-02-19 RX ORDER — PRAZOSIN HYDROCHLORIDE 2 MG/1
2 CAPSULE ORAL NIGHTLY
Status: ON HOLD | COMMUNITY
End: 2022-02-22 | Stop reason: HOSPADM

## 2022-02-19 RX ORDER — PANTOPRAZOLE SODIUM 40 MG/1
40 TABLET, DELAYED RELEASE ORAL
Status: ON HOLD | COMMUNITY
End: 2022-02-22 | Stop reason: HOSPADM

## 2022-02-19 RX ORDER — CYCLOSPORINE 0.5 MG/ML
1 EMULSION OPHTHALMIC DAILY
COMMUNITY

## 2022-02-19 RX ORDER — CETIRIZINE HYDROCHLORIDE 10 MG/1
10 TABLET ORAL DAILY
COMMUNITY

## 2022-02-19 RX ORDER — AMLODIPINE BESYLATE 5 MG/1
10 TABLET ORAL DAILY
Status: DISCONTINUED | OUTPATIENT
Start: 2022-02-19 | End: 2022-02-22 | Stop reason: HOSPADM

## 2022-02-19 RX ORDER — DEXTROSE MONOHYDRATE 50 MG/ML
100 INJECTION, SOLUTION INTRAVENOUS PRN
Status: DISCONTINUED | OUTPATIENT
Start: 2022-02-19 | End: 2022-02-22 | Stop reason: HOSPADM

## 2022-02-19 RX ORDER — ARIPIPRAZOLE 15 MG/1
15 TABLET ORAL NIGHTLY
COMMUNITY

## 2022-02-19 RX ORDER — ONDANSETRON 2 MG/ML
4 INJECTION INTRAMUSCULAR; INTRAVENOUS EVERY 4 HOURS PRN
Status: DISCONTINUED | OUTPATIENT
Start: 2022-02-19 | End: 2022-02-22 | Stop reason: HOSPADM

## 2022-02-19 RX ORDER — LACTULOSE 10 G/15ML
20 SOLUTION ORAL 3 TIMES DAILY
Status: DISCONTINUED | OUTPATIENT
Start: 2022-02-19 | End: 2022-02-22 | Stop reason: HOSPADM

## 2022-02-19 RX ORDER — LISINOPRIL 20 MG/1
20 TABLET ORAL DAILY
Status: DISCONTINUED | OUTPATIENT
Start: 2022-02-19 | End: 2022-02-22 | Stop reason: HOSPADM

## 2022-02-19 RX ADMIN — LACTULOSE 20 G: 20 SOLUTION ORAL at 20:19

## 2022-02-19 RX ADMIN — SODIUM CHLORIDE, PRESERVATIVE FREE 10 ML: 5 INJECTION INTRAVENOUS at 20:20

## 2022-02-19 RX ADMIN — SODIUM CHLORIDE, PRESERVATIVE FREE 10 ML: 5 INJECTION INTRAVENOUS at 08:15

## 2022-02-19 RX ADMIN — LACTULOSE 20 G: 20 SOLUTION ORAL at 15:46

## 2022-02-19 RX ADMIN — MORPHINE SULFATE 2 MG: 2 INJECTION, SOLUTION INTRAMUSCULAR; INTRAVENOUS at 10:53

## 2022-02-19 RX ADMIN — INSULIN GLARGINE 26 UNITS: 100 INJECTION, SOLUTION SUBCUTANEOUS at 20:21

## 2022-02-19 RX ADMIN — INSULIN LISPRO 4 UNITS: 100 INJECTION, SOLUTION INTRAVENOUS; SUBCUTANEOUS at 17:28

## 2022-02-19 RX ADMIN — MORPHINE SULFATE 2 MG: 2 INJECTION, SOLUTION INTRAMUSCULAR; INTRAVENOUS at 08:11

## 2022-02-19 RX ADMIN — MORPHINE SULFATE 2 MG: 2 INJECTION, SOLUTION INTRAMUSCULAR; INTRAVENOUS at 17:28

## 2022-02-19 RX ADMIN — INSULIN LISPRO 4 UNITS: 100 INJECTION, SOLUTION INTRAVENOUS; SUBCUTANEOUS at 08:13

## 2022-02-19 RX ADMIN — INSULIN LISPRO 3 UNITS: 100 INJECTION, SOLUTION INTRAVENOUS; SUBCUTANEOUS at 20:20

## 2022-02-19 RX ADMIN — PANTOPRAZOLE SODIUM 40 MG: 40 INJECTION, POWDER, FOR SOLUTION INTRAVENOUS at 10:53

## 2022-02-19 RX ADMIN — INSULIN LISPRO 4 UNITS: 100 INJECTION, SOLUTION INTRAVENOUS; SUBCUTANEOUS at 11:01

## 2022-02-19 RX ADMIN — PHYTONADIONE 10 MG: 5 TABLET ORAL at 12:38

## 2022-02-19 RX ADMIN — MORPHINE SULFATE 2 MG: 2 INJECTION, SOLUTION INTRAMUSCULAR; INTRAVENOUS at 23:44

## 2022-02-19 ASSESSMENT — PAIN SCALES - GENERAL
PAINLEVEL_OUTOF10: 8
PAINLEVEL_OUTOF10: 10
PAINLEVEL_OUTOF10: 8

## 2022-02-19 ASSESSMENT — PAIN DESCRIPTION - LOCATION: LOCATION: CHEST

## 2022-02-19 ASSESSMENT — PAIN DESCRIPTION - ORIENTATION: ORIENTATION: LEFT

## 2022-02-19 NOTE — PLAN OF CARE
Problem: Nutrition  Goal: Optimal nutrition therapy  Outcome: Ongoing  Note: Nutrition Problem #1: Inadequate oral intake  Intervention: Food and/or Nutrient Delivery: Continue Current Diet,Modify Oral Nutrition Supplement  Nutritional Goals: Pt will tolerate and consume 50% or greater of meals and ONS this admission

## 2022-02-19 NOTE — ED NOTES
Called [facility] @9262  Per Yoav SHEEHAN-CNP  Re liver disease, elevate INR, bleeding, cholecystitis   returned page @0252       Rosanna Kraft  02/18/22 2629

## 2022-02-19 NOTE — CONSULTS
Consult call back    Who:Dr. Pato Avery  Date:2/19/2022,  Time:12:57 PM        Electronically signed by Genet Guaman on 2/19/2022 at 12:57 PM

## 2022-02-19 NOTE — PROGRESS NOTES
Page to MD for pain medication. Pt in 10/10 chest/abd pain, see new order and eMAR for medications given.
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
## 2022-02-19 NOTE — PROGRESS NOTES
Comprehensive Nutrition Assessment    Type and Reason for Visit:  Initial,Positive Nutrition Screen (MST=2: weight loss)    Nutrition Recommendations/Plan:   1. Continue carb control, 2 gm sodium diet - monitor need for liberalization   2. Modify ONS to Glucerna with meals - monitor acceptance   3. Encourage PO intakes as tolerated   4. Monitor A1c labs  5. Provide diet education when appropriate   6. Monitor nutrition adequacy, pertinent labs, bowel habits, wt changes, and clinical progress    Nutrition Assessment:  48 yo male admitted with acute anemia. Hx of HTN and alcoholic cirrhosis. Pt nutritionally compromised AEB poor appetite and PO intakes. Pt reports only tolerating clear liquids and fruit at this time. Poor intakes x~1 week PTA. No weight loss noted, but has ascites and edema. Agreeable to ONS, will modify current to lower carbohydrate option. Encouraged PO intakes as tolerated. Handouts left at bedside on carb control diet, pt very tired this date. Contact information left at bedside for questions, RD to reattempt education at later date. Will monitor. Malnutrition Assessment:  Malnutrition Status: At risk for malnutrition (Comment)    Context:  Acute Illness     Findings of the 6 clinical characteristics of malnutrition:  Energy Intake:  1 - 75% or less of estimated energy requirements for 7 or more days    Estimated Daily Nutrient Needs:  Energy (kcal):  1195-3824 kcal; Weight Used for Energy Requirements:  Ideal (70 kg)     Protein (g):  70-84 g; Weight Used for Protein Requirements:  Ideal (1.0-1.2 g/kg)        Fluid (ml/day):   ; Method Used for Fluid Requirements:  1 ml/kcal      Nutrition Related Findings:  Ascites. +2 generalized edema. Jaundice. Last BM on 2/18, lactulose added. -392 since admission. A1c ordered, monitor results.       Wounds:  None       Current Nutrition Therapies:    ADULT ORAL NUTRITION SUPPLEMENT; Lunch, Dinner; Standard High Calorie/High Protein Oral Supplement  ADULT DIET; Regular; 5 carb choices (75 gm/meal);  Low Sodium (2 gm)    Anthropometric Measures:  · Height: 5' 8\" (172.7 cm)  · Current Body Weight: 232 lb (105.2 kg)   · Ideal Body Weight: 154 lbs; % Ideal Body Weight 150.6 %   · BMI: 35.3  · BMI Categories: Obese Class 2 (BMI 35.0 -39.9)       Nutrition Diagnosis:   · Inadequate oral intake related to altered GI function as evidenced by poor intake prior to admission      Nutrition Interventions:   Food and/or Nutrient Delivery:  Continue Current Diet,Modify Oral Nutrition Supplement  Nutrition Education/Counseling:  Education needed (handouts left at bedside, will provide when appropriate)   Coordination of Nutrition Care:  Continue to monitor while inpatient    Goals:  Pt will tolerate and consume 50% or greater of meals and ONS this admission       Nutrition Monitoring and Evaluation:   Behavioral-Environmental Outcomes:  Readiness for Change,Knowledge or Skill   Food/Nutrient Intake Outcomes:  Food and Nutrient Intake,Supplement Intake  Physical Signs/Symptoms Outcomes:  Biochemical Data,GI Status,Nausea or Vomiting,Nutrition Focused Physical Findings,Weight     Discharge Planning:    Continue current diet,Continue Oral Nutrition Supplement     Electronically signed by Becky Lua, MS, RD, LD on 2/19/22 at 1:26 PM EST    Contact: [phone]

## 2022-02-19 NOTE — ED PROVIDER NOTES
EMERGENCY DEPARTMENT ENCOUNTER      This patient was not seen and evaluated by the attending physician. Pt Name: Becky Farfan  MRN: [id number]  [place] 1975  Date of evaluation: 2/18/2022  Provider: SISSY FullerC  PCP: Shannon Bajwa  ED Attending: Laura Loya MD    History provided by the patient. CHIEF COMPLAINT:     Chief Complaint   Patient presents with    Abnormal Lab     sent by PCP for abnormal lab work, does not know what levels are off - thinks it's his ammonia level, hx of liver and ascites    Other     large bruisng along entire left side of trunk and swelling into left arm       HISTORY OF PRESENT ILLNESS:      Becky Farfan is a 49 y.o. male who presents [facility]  ED with complaints of having abnormal labs. Patient had some labs as an outpatient states that he is not sure what labs are abnormal.  Patient complaining of left-sided pain and generalized abdominal pain. Patient with extensive ecchymosis noted to his left arm and chest.  Patient with a history of alcoholism, here for further evaluation    Location:left side of chest/arm  Quality:ache  Severity:9  Duration:today  Modifying factor-none noted    Nursing Notes were reviewed     REVIEW OF SYSTEMS:     Review of Systems  All systems, atotal of 10, are reviewed and negative except for those that were just noted in history present illness. PAST MEDICAL HISTORY:     Past Medical History:   Diagnosis Date    Hypertension          SURGICAL HISTORY:    History reviewed. No pertinent surgical history. CURRENT MEDICATIONS:       Current Discharge Medication List      CONTINUE these medications which have NOT CHANGED    Details   lisinopril (PRINIVIL;ZESTRIL) 20 MG tablet Take 20 mg by mouth daily      amLODIPine (NORVASC) 10 MG tablet Take 10 mg by mouth daily               ALLERGIES:    Patient has no known allergies. FAMILY HISTORY:     History reviewed.  No pertinent family history. SOCIAL HISTORY:       Social History     Socioeconomic History    Marital status: Single     Spouse name: None    Number of children: None    Years of education: None    Highest education level: None   Occupational History    None   Tobacco Use    Smoking status: Former Smoker    Smokeless tobacco: Never Used   Substance and Sexual Activity    Alcohol use: Not Currently    Drug use: Yes     Types: Marijuana Thurl Cipro)    Sexual activity: Yes     Partners: Female   Other Topics Concern    None   Social History Narrative    None     Social Determinants of Health     Financial Resource Strain:     Difficulty of Paying Living Expenses: Not on file   Food Insecurity:     Worried About Running Out of Food in the Last Year: Not on file    Felix of Food in the Last Year: Not on file   Transportation Needs:     Lack of Transportation (Medical): Not on file    Lack of Transportation (Non-Medical):  Not on file   Physical Activity:     Days of Exercise per Week: Not on file    Minutes of Exercise per Session: Not on file   Stress:     Feeling of Stress : Not on file   Social Connections:     Frequency of Communication with Friends and Family: Not on file    Frequency of Social Gatherings with Friends and Family: Not on file    Attends Denominational Services: Not on file    Active Member of [address] [place] [address] or Organizations: Not on file    Attends Club or Organization Meetings: Not on file    Marital Status: Not on file   Intimate Partner Violence:     Fear of Current or Ex-Partner: Not on file    Emotionally Abused: Not on file    Physically Abused: Not on file    Sexually Abused: Not on file   Housing Stability:     Unable to Pay for Housing in the Last Year: Not on file    Number of [place] in the Last Year: Not on file    Unstable Housing in the Last Year: Not on file       SCREENINGS:   [place] Coma Scale  Eye Opening: Spontaneous  Best Verbal Response: Oriented  Best Motor Response: Obeys commands  Marita Coma Scale Score: 15        PHYSICAL EXAM:       ED Triage Vitals [02/18/22 1824]   BP Temp Temp Source Pulse Resp SpO2 Height Weight   (!) 164/72 98.1 °F (36.7 °C) Oral 91 18 98 % 5' 8\" (1.727 m) 232 lb (105.2 kg)       Physical Exam    CONSTITUTIONAL: Awake and alert. Cooperative. Well-developed. Well-nourished. Vitals:    02/18/22 2135 02/18/22 2140 02/18/22 2145 02/18/22 2154   BP: 138/65 133/76 138/65 137/81   Pulse:  81 86 83   Resp:  12 17 14   Temp: 98.1 °F (36.7 °C) 98.1 °F (36.7 °C) 98 °F (36.7 °C) 98.1 °F (36.7 °C)   TempSrc: Oral Oral Oral Oral   SpO2:  93% 94% 93%   Weight:       Height:         HENT: Normocephalic. Atraumatic. External ears normal, without discharge. TMs clear bilaterally. No nasal discharge. Oropharynx clear, no erythema. Mucous membranes moist.  EYES: Conjunctiva non-injected, no lid abnormalities noted. No scleral icterus. PERRL. EOM's grossly intact. Anterior chambers clear. NECK: Supple. Normal ROM. No meningismus. No thyroid tenderness or swelling noted. CARDIOVASCULAR: RRR. No Murmer. PULMONARY/CHEST WALL: Effort normal. No tachypnea. Lungs clear to ausculation. Tenderness of palpation to the left anterior chest wall extending into the left arm. ABDOMEN: Normal BS. Soft. Nondistended. Generalized tenderness. No guarding. No hernias noted. No splenomegaly. Back: Spine is midline. No ecchymosis. No crepitus on palpation. No obvious subluxation of vertebral column. No saddle anesthesia or evidence of cauda equina. /ANORECTAL: Not assessed  MUSKULOSKELETAL: Normal ROM. No acute deformities. Significant edema noted to the left arm it is soft, no evidence of compartment syndrome, 2+ radial pulse present. no tenderness to palpate. SKIN: Warm and dry. Significant amount of ecchymosis noted across his chest and abdomen and low back, skin is icteric as well. NEUROLOGICAL:  GCS 15. CN II-XII grossly intact.  Strength is 5/5 in allextremities and sensation is intact. PSYCHIATRIC: Normal affect, normal insight and judgement. Alert andoriented x 3.                     DIAGNOSTIC RESULTS:     LABS:    Results for orders placed or performed during the hospital encounter of 02/18/22   CBC with Auto Differential   Result Value Ref Range    WBC 12.3 (H) 4.0 - 11.0 K/uL    RBC 1.91 (L) 4.20 - 5.90 M/uL    Hemoglobin 6.9 (LL) 13.5 - 17.5 g/dL    Hematocrit 20.1 (LL) 40.5 - 52.5 %    .8 (H) 80.0 - 100.0 fL    MCH 36.1 (H) 26.0 - 34.0 pg    MCHC 34.4 31.0 - 36.0 g/dL    RDW 25.8 (H) 12.4 - 15.4 %    Platelets 47 (L) 592 - 450 K/uL    MPV 8.7 5.0 - 10.5 fL    PLATELET SLIDE REVIEW Decreased     SLIDE REVIEW see below     Path Consult Yes     Neutrophils % 86.6 %    Lymphocytes % 3.7 %    Monocytes % 9.3 %    Eosinophils % 0.2 %    Basophils % 0.2 %    Neutrophils Absolute 10.6 (H) 1.7 - 7.7 K/uL    Lymphocytes Absolute 0.5 (L) 1.0 - 5.1 K/uL    Monocytes Absolute 1.1 0.0 - 1.3 K/uL    Eosinophils Absolute 0.0 0.0 - 0.6 K/uL    Basophils Absolute 0.0 0.0 - 0.2 K/uL    Toxic Granulation Present (A)     Anisocytosis 3+ (A)     Polychromasia 2+ (A)     Hypochromia 2+ (A)     Poikilocytes 3+ (A)     Schistocytes 1+ (A)     Bud Cells 1+ (A)     CRENATED RBC'S 2+ (A)    Comprehensive Metabolic Panel   Result Value Ref Range    Sodium 133 (L) 136 - 145 mmol/L    Potassium 4.8 3.5 - 5.1 mmol/L    Chloride 101 99 - 110 mmol/L    CO2 19 (L) 21 - 32 mmol/L    Anion Gap 13 3 - 16    Glucose 392 (H) 70 - 99 mg/dL    BUN 14 7 - 20 mg/dL    CREATININE <0.5 (L) 0.9 - 1.3 mg/dL    GFR Non-African American >60 >60    GFR African American >60 >60    Calcium 8.8 8.3 - 10.6 mg/dL    Total Protein 6.4 6.4 - 8.2 g/dL    Albumin 3.0 (L) 3.4 - 5.0 g/dL    Albumin/Globulin Ratio 0.9 (L) 1.1 - 2.2    Total Bilirubin 26.5 (H) 0.0 - 1.0 mg/dL    Alkaline Phosphatase 139 (H) 40 - 129 U/L    ALT 54 (H) 10 - 40 U/L     (H) 15 - 37 U/L   Troponin   Result Value Ref Range    Troponin <0.01 <0.01 ng/mL   Protime-INR   Result Value Ref Range    Protime 50.1 (H) 9.9 - 12.7 sec    INR 4.18 (H) 0.88 - 1.12   Lipase   Result Value Ref Range    Lipase 105.0 (H) 13.0 - 60.0 U/L   Ammonia   Result Value Ref Range    Ammonia 65 (H) 16 - 60 umol/L   Urinalysis   Result Value Ref Range    Color, UA Yellow Straw/Yellow    Clarity, UA Clear Clear    Glucose, Ur >=1000 (A) Negative mg/dL    Bilirubin Urine LARGE (A) Negative    Ketones, Urine Negative Negative mg/dL    Specific Gravity, UA 1.015 1.005 - 1.030    Blood, Urine TRACE-INTACT (A) Negative    pH, UA 6.0 5.0 - 8.0    Protein, UA Negative Negative mg/dL    Urobilinogen, Urine 0.2 <2.0 E.U./dL    Nitrite, Urine Negative Negative    Leukocyte Esterase, Urine Negative Negative    Microscopic Examination YES     Urine Type NotGiven    Brain Natriuretic Peptide   Result Value Ref Range    Pro-BNP 83 0 - 124 pg/mL   Ethanol   Result Value Ref Range    Ethanol Lvl None Detected mg/dL   Magnesium   Result Value Ref Range    Magnesium 1.90 1.80 - 2.40 mg/dL   Microscopic Urinalysis   Result Value Ref Range    WBC, UA 0-2 0 - 5 /HPF    RBC, UA 0-2 0 - 4 /HPF    Epithelial Cells, UA 0-1 0 - 5 /HPF   EKG 12 Lead   Result Value Ref Range    Ventricular Rate 86 BPM    Atrial Rate 86 BPM    P-R Interval 142 ms    QRS Duration 92 ms    Q-T Interval 396 ms    QTc Calculation (Bazett) 473 ms    P Axis 28 degrees    R Axis 0 degrees    T Axis 25 degrees    Diagnosis       Normal sinus rhythmNormal ECGWhen compared with ECG of 22-DEC-2021 17:58,No significant change was found   TYPE AND SCREEN   Result Value Ref Range    ABO/Rh A POS     Antibody Screen NEG    PREPARE RBC (CROSSMATCH), 1 Units   Result Value Ref Range    Product Code Blood Bank [id number]     Description Blood Bank Red Blood Cells, Leuko-reduced     Unit Number [id number]     Dispense Status Blood Bank issued    PREPARE PLATELETS, 1 Product   Result Value Ref Range    Product Code Blood Bank S4809I18 Description Blood Bank [id number]     Unit Number [id number]     Dispense Status Blood Bank selected          RADIOLOGY:  All x-ray studies are viewed/reviewedby me. Formal interpretations per the radiologist are as follows:      CT CHEST ABDOMEN PELVIS W CONTRAST   Final Result   1. Prominent soft tissue hematomas along the left chest.  Correlate for   history of trauma and/or coagulopathy. 2. Moderate/large left pleural effusion with associated left lower lobe and   lingula atelectasis/partial collapse. 3. Scattered ground-glass opacities predominantly in the right upper lobe. Findings are nonspecific and may be related to edema, contusions, or foci of   inflammation/infection. 4. Fat containing left diaphragmatic hernia. 5. Cirrhotic morphology of the liver with associated portosystemic   collaterals and splenomegaly. 6. Small/moderate ascites. 7. Distended gallbladder with associated wall thickening and pericholecystic   fluid. Findings may be related to ascites, though possibility of   cholecystitis is not excluded in the appropriate clinical setting. 8. Diffuse soft tissue edema. 9. Mild bilateral gynecomastia. EKG:  See EKG interpretation by an attending phsyician      PROCEDURES:   N/A    CRITICAL CARE TIME:   Total critical care time today provided was at least 37 minutes. This excludes seperately billable procedure. Critical care time provided for severe anemia/thrombocytopenia that required close evaluation and/or intervention with concern for patient decompensation.     CONSULTS:  IP CONSULT TO GI      EMERGENCYDEPARTMENT COURSE and DIFFERENTIAL DIAGNOSIS/MDM:   Vitals:    Vitals:    02/18/22 2135 02/18/22 2140 02/18/22 2145 02/18/22 2154   BP: 138/65 133/76 138/65 137/81   Pulse:  81 86 83   Resp:  12 17 14   Temp: 98.1 °F (36.7 °C) 98.1 °F (36.7 °C) 98 °F (36.7 °C) 98.1 °F (36.7 °C)   TempSrc: Oral Oral Oral Oral   SpO2:  93% 94% 93%   Weight:       Height: Patient was given the following medications:  Medications   0.9 % sodium chloride infusion (has no administration in time range)   0.9 % sodium chloride infusion (has no administration in time range)   morphine sulfate (PF) injection 4 mg (4 mg IntraVENous Given 2/18/22 1925)   ondansetron (ZOFRAN) injection 4 mg (4 mg IntraVENous Given 2/18/22 1925)   0.9 % sodium chloride bolus (0 mLs IntraVENous Stopped 2/18/22 2024)   phytonadione ADULT (AQUA-MEPHYTON) inj 10 mg/mL (10 mg SubCUTAneous Given 2/18/22 1944)   iopamidol (ISOVUE-370) 76 % injection 75 mL (75 mLs IntraVENous Given 2/18/22 2042)         Patient was evaluated by both myself and Jacqueline Kathleen MD.    Patient presented to the emergency room today with complaints that his INR was elevated, patient has a history of alcoholic cirrhosis, he has an extensive amount of ecchymosis noted to his chest, abdomen, low back. Patient denies any trauma. He states that he is not drinking anymore. Patient hemoglobin today was 6.9, his INR was elevated over 4, his platelet was only 47 so we did give him a unit of packed red blood cells as well as platelets. I did do a CT of the chest abdomen pelvis with contrast which showed prominent soft tissue hematomas along the left chest he had moderate to large left pleural effusion with associated left lower lobe and lingular atelectasis/partial collapse, he had scattered groundglass opacities predominantly in the right upper lobe. Cirrhotic morphology of his liver with small to moderate ascites, he also had a distended gallbladder with associated wall thickening and pericholecystic fluid findings could be related to ascites or possibly a cholecystitis is not excluded. Patient is actually been hemodynamically stable here his vital signs have been stable and consistent during his visit.   I did speak with [facility] hepatologist, Dr. Isabelle Pinto, she did not recommend transfer to [facility], she felt patient was safe to be admitted here, states that she would just give vitamin K as needed to correct coagulopathy and follow-up as an outpatient, she did not feel he was a transplant candidate. I did send perfect serve communication to Dr. Shaila Sharma and anticipate admission to the hospital, patient is agreeable with this plan. Patient laboratory studies, radiographic imaging, andassessment were all discussed with the patient and/or patient family. There was shared decision-making between myself, the attending physician, as well as the patient and/or their surrogate and we are all in agreement with admission. There was an opportunity for questions and all questions were answered to the best of my ability and to the satisfaction of the patient and/or patient family. FINAL IMPRESSION:      1. Anemia, unspecified type    2. Alcoholic cirrhosis, unspecified whether ascites present ([address]    3. Diabetes mellitus, new onset ([address]    4. Elevated bilirubin    5. Ecchymosis    6.  Thrombocytopenia ([address]          DISPOSITION/PLAN:   DISPOSITION      PATIENT REFERRED TO:  Nicky Rowland [id number]  [place] [address] [phone]    Call   For follow up    [facility] [address] [phone]  Go to   If symptoms worsen      DISCHARGE MEDICATIONS:  Current Discharge Medication List                     (Please note that portions of this note were completed with a voice recognition program.  Efforts were made to edit the dictations, but occasionally words are mis-transcribed.)    SISSY Gilliam CNP-C (electronically signed)        SISSY Gilliam CNP  02/18/22 3059

## 2022-02-19 NOTE — H&P
Hospital Medicine  History and Physical    PCP: Gary Cardenas  Patient Name: David Miller    Date of Service: Pt seen/examined on 2/18/22 and admitted to Inpatient with expected LOS greater than two midnights due to medical therapy    CHIEF COMPLAINT:  Pt sent by his primary care physician for evaluation of abnormal labs  HISTORY OF PRESENT ILLNESS: Pt is an 49y.o. year-old male with a history of hypertension and alcoholic cirrhosis of the liver. He presents to the emergency room at the request of his primary care physician for evaluation of abnormal labs. He does not know which labs were abnormal.  He also complains of generalized abdominal pain and left-sided pain. He does have an extensive area of ecchymosis involving his entire left side of his trunk with swelling extending into his left arm. Upon evaluation he was found to have a hemoglobin of 6.9, a platelet count of 47, a large pleural effusion on the left side, abnormal liver function tests and a glucose level of 392. He has no prior history of diabetes mellitus. He is being admitted for further evaluation and treatment. Associated signs and symptoms do not include fever or chills, nausea, vomiting, hemoptysis, hematochezia, diarrhea, constipation or urinary symptoms. Past Medical History:        Diagnosis Date    Hypertension        Past Surgical History:    History reviewed. No pertinent surgical history. Allergies:  Patient has no known allergies. Medications Prior to Admission:    Prior to Admission medications    Medication Sig Start Date End Date Taking? Authorizing Provider   lisinopril (PRINIVIL;ZESTRIL) 20 MG tablet Take 20 mg by mouth daily    Historical Provider, MD   amLODIPine (NORVASC) 10 MG tablet Take 10 mg by mouth daily    Historical Provider, MD       Family History:   Family history is negative for accelerated coronary artery disease, diabetes or malignancies.     Social History:   TOBACCO:   reports that he has quit smoking. He has never used smokeless tobacco.  ETOH:   reports previous alcohol use. OCCUPATION:      REVIEW OF SYSTEMS:  A full review of systems was performed and is negative except for that which appears in the HPI    Physical Exam:    Vitals: /80   Pulse 86   Temp 98.2 °F (36.8 °C) (Oral)   Resp 11   Ht 5' 8\" (1.727 m)   Wt 232 lb (105.2 kg)   SpO2 93%   BMI 35.28 kg/m²   General appearance: Chronically ill appearing 49y.o. year-old male who is alert, appears stated age and is cooperative  HEENT: Head: Normocephalic, no lesions, without obvious abnormality. Eye: Normal external eye, conjunctiva, lids cornea, PEERL. Ears: Normal external ears. Non-tender. Nose: Normal external nose, mucus membranes and septum. Pharynx: Dental Hygiene adequate. Normal buccal mucosa. Normal pharynx. Neck: no adenopathy, no carotid bruit, no JVD, supple, symmetrical, trachea midline and thyroid not enlarged, symmetric, no tenderness/mass/nodules  Lungs: clear to auscultation bilaterally and no use of accessory muscles  Heart: regular rate and rhythm, S1, S2 normal, no murmur, click, rub or gallop and normal apical impulse  Abdomen: + ascites; bowel sounds normal; no masses  Extremities: Left arm is swollen. Homans sign is negative, no sign of DVT. Capillary Refill: Acceptable < 3 seconds   Peripheral Pulses: +3 easily felt, not easily obliterated with pressures   Skin: Ecchymosis involving the entire left trunk and into the left arm  Neurologic: Neurovascularly intact without any focal sensory/motor deficits. Cranial nerves: II-XII intact, grossly non-focal. Gait was not tested.   Mental Status: Alert and oriented, thought content appropriate, normal insight      CBC:   Recent Labs     02/18/22 1920   WBC 12.3*   HGB 6.9*   PLT 47*     BMP:    Recent Labs     02/18/22 1920   *   K 4.8      CO2 19*   BUN 14   CREATININE <0.5*   GLUCOSE 392*     Troponin:   Recent Labs     02/18/22 1920   TROPONINI <0.01     PT/INR:  No results found for: PTINR  U/A:    Lab Results   Component Value Date    LEUKOCYTESUR Negative 02/18/2022    RBCUA 0-2 02/18/2022    SPECGRAV 1.015 02/18/2022    UROBILINOGEN 0.2 02/18/2022    BILIRUBINUR LARGE 02/18/2022    BLOODU TRACE-INTACT 02/18/2022    GLUCOSEU >=1000 02/18/2022    PROTEINU Negative 02/18/2022         RAD:   I have independently reviewed and interpreted the imaging studies below and based my recommendations to the patient on those findings. CT CHEST ABDOMEN PELVIS W CONTRAST    Result Date: 2/18/2022  EXAMINATION: CT OF THE CHEST, ABDOMEN, AND PELVIS WITH CONTRAST 2/18/2022 8:21 pm TECHNIQUE: CT of the chest, abdomen and pelvis was performed with the administration of intravenous contrast. Multiplanar reformatted images are provided for review. Dose modulation, iterative reconstruction, and/or weight based adjustment of the mA/kV was utilized to reduce the radiation dose to as low as reasonably achievable. COMPARISON: 03/31/2021. HISTORY: ORDERING SYSTEM PROVIDED HISTORY: bleeding TECHNOLOGIST PROVIDED HISTORY: Reason for exam:->bleeding Additional Contrast?->None Decision Support Exception - unselect if not a suspected or confirmed emergency medical condition->Emergency Medical Condition (MA) Reason for Exam: bleeding; bruising entire abdomen; left upper arm and back; severe jaundice; pt states last drink was 2/13/2022 personal hx of HTN FINDINGS: Chest: Mediastinum: No mediastinal or hilar lymphadenopathy. Normal caliber of the thoracic aorta. The heart size is normal.  Scattered coronary artery calcifications. No significant pericardial effusion. Lungs/pleura: Moderate/large left pleural effusion with associated left lower lobe and lingula atelectasis/partial collapse. Scattered ground-glass opacities predominantly throughout the right upper lobe and to lesser degree left upper lobe and right lower lobe. Fat containing left diaphragmatic hernia.  Soft Tissues/Bones: Diffuse soft tissue edema. Prominent soft tissue hematoma is present along the left chest measuring up to 4.1 x 10.9 x 13.0 under the left pectoralis and 13.0 x 5.4 x 7.6 cm along the left lateral chest.  Mild bilateral gynecomastia. No acute osseous abnormalities. Thoracic dextroscoliosis (apex T6-T7). Abdomen/Pelvis: Organs: Cirrhotic morphology of the liver with associated portosystemic collaterals and splenomegaly. Gallbladder appears distended with wall thickening and small amount of pericholecystic fluid. Splenomegaly. No acute abnormality of the pancreas, adrenal glands, or kidneys. GI/Bowel: Bowel caliber is normal.  There is no evidence of active bowel inflammation. There is no evidence of acute appendicitis. Pelvis: No acute abnormality of the pelvic viscera. Peritoneum/Retroperitoneum: Small/moderate ascites. Diffuse peritoneal stranding. No free air. No lymphadenopathy. Soft Tissues/Bones: Diffuse soft tissue edema. No acute osseous abnormalities. 1. Prominent soft tissue hematomas along the left chest.  Correlate for history of trauma and/or coagulopathy. 2. Moderate/large left pleural effusion with associated left lower lobe and lingula atelectasis/partial collapse. 3. Scattered ground-glass opacities predominantly in the right upper lobe. Findings are nonspecific and may be related to edema, contusions, or foci of inflammation/infection. 4. Fat containing left diaphragmatic hernia. 5. Cirrhotic morphology of the liver with associated portosystemic collaterals and splenomegaly. 6. Small/moderate ascites. 7. Distended gallbladder with associated wall thickening and pericholecystic fluid. Findings may be related to ascites, though possibility of cholecystitis is not excluded in the appropriate clinical setting. 8. Diffuse soft tissue edema. 9. Mild bilateral gynecomastia.          EKG:   Read by ER in the absence of a Cardiologist shows  normal sinus rhythm with a rate of 86  Axis is   Normal  QTc is  normal  Intervals and Durations are unremarkable. ST Segments: no acute change and normal  No significant change from prior EKG dated - 12/22/21  No STEMI       Assessment:   Principal Problem:    Anemia  Active Problems:    Alcoholic cirrhosis of liver (HCC)    Essential hypertension    Coagulopathy (HCC)    Thrombocytopenia (HCC)    Splenomegaly    New onset type 2 diabetes mellitus (HCC)    Transaminitis    Hyperbilirubinemia    Elevated alkaline phosphatase level    Pleural effusion on left    Hypoalbuminemia    Ascites due to alcoholic cirrhosis (HCC)    Hyperammonemia (HCC)    Alcohol abuse  Resolved Problems:    * No resolved hospital problems. *      Plan:       Anemia - Etiology unclear; will check stool for occult blood, will check the reticulocyte count, soluble transferrin receptor, iron, TIBC, ferritin, B12 and folate. Patient will be transfused 1 unit of packed red blood cells we will monitor his hemoglobin and transfuse as indicated to maintain a hemoglobin of 7.0 or greater. Alcoholic cirrhosis of liver (HCC) with associated liver failure  - Transaminitis  - Hyperbilirubinemia  - Elevated alkaline phosphatase level  - Ascites due to alcoholic cirrhosis (HCC)  - Hyperammonemia (HCC) - will monitor daily  - Evidence of poor synthetic function of the liver  --- Coagulopathy ([address] - Pt was given 10 units Vitamin K SQ  --- Hypoalbuminemia    Thrombocytopenia (HCC) - appears to be secondary to Splenomegaly. Due to his extensive ecchymosis, he is being transfused platelets    Pleural effusion on left (large) -pulmonology will be consulted. However, a thoracentesis may not be advised until his coagulopathy improves      Alcohol abuse -patient reports that he no longer drinks alcohol. However, his AST is 2x> than ALT.  We will monitor for withdrawal symptoms and start CIWA if necessary    Diabetes mellitus II (newly diagnosed) - Will start Lantus, SSI and carb control diet. Check a HgbA1c and consult the diabetic educator    Essential (primary) hypertension - continue home meds and monitor blood pressure    Morbid obesity due to excess calories (Body mass index is 35.28 kg/m². ) - Complicating assessment and treatment. Placing patient at risk for multiple co-morbidities as well as early death and contributing to the patient's presentation.  on weight loss when appropriate. DVT Prophylaxis: SCDs  Diet: ADULT ORAL NUTRITION SUPPLEMENT; Lunch, Dinner; Standard High Calorie/High Protein Oral Supplement  Code Status: No Order  (Advanced care planning has been discussed with patient and/or responsible family member and is reflected in the code status.  Further orders associated with this have been entered if appropriate)    Disposition: Anticipate that patient will remain in the hospital for 2 to 3+ days depending on further evaluation and clinical course     Please note that over 50 minutes was spent in evaluating the patient, review of records and results, discussion with staff/family, etc.    Ricci Bryant MD
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
## 2022-02-19 NOTE — CONSULTS
Consult call back    Who:dr. Patel Quiver  Date:2/19/2022,  Time:9:40 AM        Electronically signed by Harley Leigh on 2/19/2022 at 9:40 AM

## 2022-02-19 NOTE — ACP (ADVANCE CARE PLANNING)
Advance Care Planning     Advance Care Planning Inpatient Note  [facility] Department    Today's Date: 2/19/2022  Unit: [place] C3 TELE/MED SURG/ONC    Received request from patient. Upon review of chart and communication with care team, patient's decision making abilities are not in question. . Patient and significant other was/were present in the room during visit. Goals of ACP Conversation:  Discuss advance care planning documents  Facilitate a discussion related to patient's goals of care as they align with the patient's values and beliefs. Health Care Decision Makers:       Primary Decision Maker: Tuan Wellington - Domestic Partner - [phone]       Advance Care Planning Documents (Patient Wishes):  None     Assessment:  Gave and explained POA and Living Will forms to pt and his fiancee. They were very pleasant and receptive. They seem to understand everything, especially since she works in healthcare. They will look over the forms and discuss them and call when ready to complete them. They are seeking to get  as well. They are going to try to get their marriage license and asked this  to officiate their wedding in the room. I agreed and told them to have the nurse page me when they are ready.      Interventions:  Provided education on documents for clarity and greater understanding  Encouraged ongoing ACP conversation with future decision makers and loved ones  Reviewed but did not complete ACP document    Care Preferences Communicated:   No    Outcomes/Plan:  ACP Discussion: Completed  Teach Back Method used to verify the patient's and/or Healthcare Decision Maker's understanding of key information in the advance directive documents    Electronically signed by DIANNE Haney, MDiv, [employer] on 2/19/2022 at 5:36 PM

## 2022-02-19 NOTE — CONSULTS
Consult placed   On floor aware of consult  Who:Dr. Alfred Phalen  Date:2/19/2022,  Time:8:35 AM        Electronically signed by Darío Rojas on 2/19/2022 at 8:35 AM

## 2022-02-19 NOTE — CARE COORDINATION
CASE MANAGEMENT INITIAL ASSESSMENT      Reviewed chart and completed assessment with patient:  Explained Case Management role/services. Health Care Decision Maker :   Primary Decision Maker: Crys Lindsey - Domestic Partner - [phone]        Admit date/status: IPA 2/18/22  Diagnosis: ecchymosis     Is this a Readmission?:  No      Insurance: [address] [id number] required for SNF: Yes       3 night stay required: No    Living arrangements, Adls, care needs, prior to admission: resides in 3rd floor apt with fiance. IPTA. Transportation: pt to arrange if dc home     [address] at home:  none    Services in the home and/or outpatient, prior to admission: none    PT/OT recs: [facility] Exemption Notification (HEN): not started    Barriers to discharge: none    Plan/comments: pt intends to dc home without needs. Please consult CM team if needs arise.       Molly Messer RN

## 2022-02-19 NOTE — PROGRESS NOTES
Hospitalist Progress Note      PCP: Valeria Aragon    Date of Admission: 2/18/2022    Chief Complaint:   Abd and chest wall pain    Hospital Course:     Pt is an 49y.o. year-old male with a history of hypertension and alcoholic cirrhosis of the liver. He presents to the emergency room at the request of his primary care physician for evaluation of abnormal labs. He does not know which labs were abnormal.  He also complains of generalized abdominal pain and left-sided pain. He does have an extensive area of ecchymosis involving his entire left side of his trunk with swelling extending into his left arm. Upon evaluation he was found to have a hemoglobin of 6.9, a platelet count of 47, a large pleural effusion on the left side, abnormal liver function tests and a glucose level of 392. He has no prior history of diabetes mellitus. He is being admitted for further evaluation and treatment. Associated signs and symptoms do not include fever or chills, nausea, vomiting, hemoptysis, hematochezia, diarrhea, constipation or urinary symptoms. Patient diagnosed with EtOH related liver disease in 2021. 2/1/2022 Tbili = 14.5. Patient was admitted on 2/1 for hematoma in chest and coagulopathy. Patient with progressive ecchymoses and pain in left chest over past week. No recent trauma. No melena or BRBPR. No nausea. Patient denies seizure. He was seeing Dr. Lakeisha Montgomery as outpatient. Subjective:     Patient complaining of abd and chest wall pain. He does not have any shortness of breath. Mental status at baseline. Patient's Significant other is in the room.      Medications:  Reviewed    Infusion Medications    dextrose      sodium chloride      sodium chloride      sodium chloride       Scheduled Medications    amLODIPine  10 mg Oral Daily    insulin lispro  0-12 Units SubCUTAneous TID WC    insulin lispro  0-6 Units SubCUTAneous Nightly    sodium chloride flush  10 mL IntraVENous 2 times per day    insulin glargine  0.25 Units/kg SubCUTAneous Nightly    lisinopril  20 mg Oral Daily    pantoprazole  40 mg IntraVENous Daily    lactulose  20 g Oral TID     PRN Meds: glucose, glucagon (rDNA), dextrose, sodium chloride flush, sodium chloride, ondansetron, polyethylene glycol, acetaminophen **OR** acetaminophen, dextrose bolus (hypoglycemia) **OR** dextrose bolus (hypoglycemia), morphine, sodium chloride, sodium chloride      Intake/Output Summary (Last 24 hours) at 2/19/2022 1303  Last data filed at 2/19/2022 0618  Gross per 24 hour   Intake 1067.92 ml   Output 700 ml   Net 367.92 ml       Physical Exam Performed:    BP (!) 152/80   Pulse 90   Temp 98.6 °F (37 °C) (Oral)   Resp 20   Ht 5' 8\" (1.727 m)   Wt 232 lb (105.2 kg)   SpO2 94%   BMI 35.28 kg/m²     General appearance: Moderate distress, appears stated age and cooperative. HEENT: Pupils equal, round, and reactive to light. Conjunctivae/corneas clear. Neck: Supple, with full range of motion. No jugular venous distention. Trachea midline. Respiratory:  Normal respiratory effort. Clear to auscultation, bilaterally without Rales/Wheezes/Rhonchi. Breath sound diminished left chest.  Cardiovascular: Regular rate and rhythm with normal S1/S2 without murmurs, rubs or gallops. Abdomen: Distended and tender. Hypoactive bowel sounds. Musculoskeletal: Edema left arm. Good radial pulse. Sensation and cap refill normal.   Skin: See skin assessment photos. Extensive ecchymoses anterior chest L>R and abdomen. Ecchymoses dependent posterior buttock. No scrotal edema. Edema in left chest and arm. Neurologic:  Neurovascularly intact without any focal sensory/motor deficits.  Cranial nerves: II-XII intact, grossly non-focal.  Psychiatric: Alert and oriented, thought content appropriate, normal insight  Capillary Refill: Brisk,3 seconds, normal   Peripheral Pulses: +2 palpable, equal bilaterally       Labs:   Recent Labs     02/18/22  1920 02/19/22  0559   WBC 12.3* 17.5*   HGB 6.9* 7.8*   HCT 20.1* 22.1*   PLT 47* 54*     Recent Labs     02/18/22 1920 02/19/22  0559   * 136   K 4.8 4.3    103   CO2 19* 22   BUN 14 15   CREATININE <0.5* <0.5*   CALCIUM 8.8 8.6     Recent Labs     02/18/22 1920 02/19/22  0559   * 158*   ALT 54* 48*   BILITOT 26.5* 27.1*   ALKPHOS 139* 129     Recent Labs     02/18/22 1920   INR 4.18*     Recent Labs     02/18/22 1920   TROPONINI <0.01       Urinalysis:      Lab Results   Component Value Date    NITRU Negative 02/18/2022    WBCUA 0-2 02/18/2022    RBCUA 0-2 02/18/2022    BLOODU TRACE-INTACT 02/18/2022    SPECGRAV 1.015 02/18/2022    GLUCOSEU >=1000 02/18/2022       Radiology:  CT CHEST ABDOMEN PELVIS W CONTRAST   Final Result   1. Prominent soft tissue hematomas along the left chest.  Correlate for   history of trauma and/or coagulopathy. 2. Moderate/large left pleural effusion with associated left lower lobe and   lingula atelectasis/partial collapse. 3. Scattered ground-glass opacities predominantly in the right upper lobe. Findings are nonspecific and may be related to edema, contusions, or foci of   inflammation/infection. 4. Fat containing left diaphragmatic hernia. 5. Cirrhotic morphology of the liver with associated portosystemic   collaterals and splenomegaly. 6. Small/moderate ascites. 7. Distended gallbladder with associated wall thickening and pericholecystic   fluid. Findings may be related to ascites, though possibility of   cholecystitis is not excluded in the appropriate clinical setting. 8. Diffuse soft tissue edema. 9. Mild bilateral gynecomastia.                  Assessment/Plan:    Active Hospital Problems    Diagnosis     Alcoholic cirrhosis of liver ([facility] [address] [K70.30]     Essential hypertension [I10]     Coagulopathy ([address] [D68.9]     Thrombocytopenia ([address] [D69.6]     Splenomegaly [R16.1]     New onset type 2 diabetes mellitus ([address] [E11.9]     Transaminitis [R74.01]     Hyperbilirubinemia [E80.6]     Elevated alkaline phosphatase level [R74.8]     Pleural effusion on left [J90]     Hypoalbuminemia [E88.09]     Ascites due to alcoholic cirrhosis (HCC) [E55.77]     Hyperammonemia (HCC) [E72.20]     Alcohol abuse [F10.10]     Portal hypertension (HCC) [K76.6]     Gynecomastia, male [N62]     Compression atelectasis [J98.11]     Elevated lipase [R74.8]     Class 2 obesity in adult [E66.9]     Suspected sleep apnea [R29.818]     Acute anemia [D64.9]      1. Anemia - Secondary to blood loss. Subcutaneous hematoma and possible GI bleed? Consult GI. Correct coagulopathy. 2. Coagulopathy secondary to liver disease - Monitor INR. Vit K given. 3. Alcohol related liver cirrhosis - Minimal ascites. GI consulted. Monitor ammonia level. MELD score 35  4. Left pleural effusion - Pulmonary consulted. Hold on thoracentesis. 5. Thrombocytopenia - Secondary to liver disease. Splenomegaly noted. Poor prognosis MELD score 35. Called  transfer center. They do not have bed available. Per discussion yesterday,  hepatology did not feel patient met criteria for transfer. Will call back if patient decompensates. DVT Prophylaxis: Coagulopathy  Diet: ADULT ORAL NUTRITION SUPPLEMENT; Lunch, Dinner; Standard High Calorie/High Protein Oral Supplement  ADULT DIET; Regular; 5 carb choices (75 gm/meal); Low Sodium (2 gm)  Code Status: Full Code    PT/OT Eval Status: Pending    Dispo - Acutely ill. Home when stable.     Madhu Rodriguez MD

## 2022-02-19 NOTE — PLAN OF CARE
Problem: Pain:  Goal: Pain level will decrease  Description: Pain level will decrease  2/19/2022 0447 by Meli Hernandez RN  Outcome: Ongoing  2/19/2022 0446 by Meli Hernandez RN  Outcome: Ongoing  Goal: Control of acute pain  Description: Control of acute pain  2/19/2022 0447 by Meli Hernandez RN  Outcome: Ongoing  2/19/2022 0446 by Meli Hernandez RN  Outcome: Ongoing  Goal: Control of chronic pain  Description: Control of chronic pain  2/19/2022 0447 by Meli Hernandez RN  Outcome: Ongoing  Note: Pt complaining of 8/10 pain, given PRN morphine in ED. Will continue to monitor and give PRN pain medication as ordered. 2/19/2022 0446 by Meli Hernandez RN  Outcome: Ongoing  Note: Pt complaining of 8/10 pain, PRN morphine given in ED. Will continue to monitor and give pain medication as ordered. Problem: Falls - Risk of:  Goal: Will remain free from falls  Description: Will remain free from falls  2/19/2022 0447 by Meli Hernandez RN  Outcome: Ongoing  2/19/2022 0447 by Meli Hernandez RN  Outcome: Ongoing  Goal: Absence of physical injury  Description: Absence of physical injury  2/19/2022 0447 by Meli Hernandez RN  Outcome: Ongoing  Note: Pt baseline is independent ambulation. D/t new found weakness in limbs and bleeding risk, pt informed to call out to use restroom and will be a SBA. Pt agreeable and encourages aid by staff. 2/19/2022 0447 by Meli Hernandez RN  Outcome: Ongoing  Note: Pt baseline is independent ambulation. D/t new found weakness, and bleeding risk, pt is ordered to ambulate SBA with bed alarm on. Pt agreeable and encourages aid by staff. Problem: Bleeding:  Goal: Will show no signs and symptoms of excessive bleeding  Description: Will show no signs and symptoms of excessive bleeding  Outcome: Ongoing  Note: Pt hemoglobin was 6.9 before transfer to [facility] & platelets were 08,765.  Pt is a bleeding risk and was given 1 unit of PRBC

## 2022-02-20 LAB
A/G RATIO: 0.8 (ref 1.1–2.2)
ACANTHOCYTES: ABNORMAL
ALBUMIN SERPL-MCNC: 2.7 G/DL (ref 3.4–5)
ALP BLD-CCNC: 104 U/L (ref 40–129)
ALT SERPL-CCNC: 45 U/L (ref 10–40)
AMMONIA: 58 UMOL/L (ref 16–60)
ANION GAP SERPL CALCULATED.3IONS-SCNC: 17 MMOL/L (ref 3–16)
ANISOCYTOSIS: ABNORMAL
AST SERPL-CCNC: 157 U/L (ref 15–37)
BANDED NEUTROPHILS RELATIVE PERCENT: 13 % (ref 0–7)
BASOPHILS ABSOLUTE: 0 K/UL (ref 0–0.2)
BASOPHILS RELATIVE PERCENT: 0 %
BILIRUB SERPL-MCNC: 33.4 MG/DL (ref 0–1)
BLOOD BANK DISPENSE STATUS: NORMAL
BLOOD BANK PRODUCT CODE: NORMAL
BPU ID: NORMAL
BUN BLDV-MCNC: 22 MG/DL (ref 7–20)
BURR CELLS: ABNORMAL
CALCIUM SERPL-MCNC: 9.1 MG/DL (ref 8.3–10.6)
CHLORIDE BLD-SCNC: 100 MMOL/L (ref 99–110)
CO2: 17 MMOL/L (ref 21–32)
CREAT SERPL-MCNC: <0.5 MG/DL (ref 0.9–1.3)
CRENATED RBC'S: ABNORMAL
D DIMER: >5250 NG/ML DDU (ref 0–229)
DESCRIPTION BLOOD BANK: NORMAL
EOSINOPHILS ABSOLUTE: 0 K/UL (ref 0–0.6)
EOSINOPHILS RELATIVE PERCENT: 0 %
GFR AFRICAN AMERICAN: >60
GFR NON-AFRICAN AMERICAN: >60
GLUCOSE BLD-MCNC: 172 MG/DL (ref 70–99)
GLUCOSE BLD-MCNC: 186 MG/DL (ref 70–99)
GLUCOSE BLD-MCNC: 204 MG/DL (ref 70–99)
GLUCOSE BLD-MCNC: 214 MG/DL (ref 70–99)
GLUCOSE BLD-MCNC: 242 MG/DL (ref 70–99)
HCT VFR BLD CALC: 19 % (ref 40.5–52.5)
HCT VFR BLD CALC: 21.8 % (ref 40.5–52.5)
HEMOGLOBIN: 6.5 G/DL (ref 13.5–17.5)
HEMOGLOBIN: 7.6 G/DL (ref 13.5–17.5)
HYPERCHROMASIA: ABNORMAL
HYPOCHROMIA: ABNORMAL
INR BLD: 3.68 (ref 0.88–1.12)
INR BLD: 4.12 (ref 0.88–1.12)
LACTIC ACID: 4.3 MMOL/L (ref 0.4–2)
LYMPHOCYTES ABSOLUTE: 1.8 K/UL (ref 1–5.1)
LYMPHOCYTES RELATIVE PERCENT: 7 %
MACROCYTES: ABNORMAL
MCH RBC QN AUTO: 36.1 PG (ref 26–34)
MCHC RBC AUTO-ENTMCNC: 34.2 G/DL (ref 31–36)
MCV RBC AUTO: 105.5 FL (ref 80–100)
METAMYELOCYTES RELATIVE PERCENT: 2 %
MICROCYTES: ABNORMAL
MONOCYTES ABSOLUTE: 1.3 K/UL (ref 0–1.3)
MONOCYTES RELATIVE PERCENT: 5 %
NEUTROPHILS ABSOLUTE: 22.4 K/UL (ref 1.7–7.7)
NEUTROPHILS RELATIVE PERCENT: 73 %
NUCLEATED RED BLOOD CELLS: 2 /100 WBC
OCCULT BLOOD SCREENING: ABNORMAL
PDW BLD-RTO: 28.2 % (ref 12.4–15.4)
PERFORMED ON: ABNORMAL
PLATELET # BLD: 51 K/UL (ref 135–450)
PLATELET SLIDE REVIEW: ABNORMAL
PMV BLD AUTO: 9.6 FL (ref 5–10.5)
POIKILOCYTES: ABNORMAL
POLYCHROMASIA: ABNORMAL
POTASSIUM REFLEX MAGNESIUM: 4.4 MMOL/L (ref 3.5–5.1)
PROCALCITONIN: 0.44 NG/ML (ref 0–0.15)
PROTHROMBIN TIME: 43.9 SEC (ref 9.9–12.7)
PROTHROMBIN TIME: 49.4 SEC (ref 9.9–12.7)
RBC # BLD: 1.8 M/UL (ref 4.2–5.9)
SCHISTOCYTES: ABNORMAL
SLIDE REVIEW: ABNORMAL
SODIUM BLD-SCNC: 134 MMOL/L (ref 136–145)
TARGET CELLS: ABNORMAL
TOTAL PROTEIN: 6 G/DL (ref 6.4–8.2)
TOXIC GRANULATION: PRESENT
WBC # BLD: 25.4 K/UL (ref 4–11)

## 2022-02-20 PROCEDURE — 2580000003 HC RX 258: Performed by: INTERNAL MEDICINE

## 2022-02-20 PROCEDURE — 85018 HEMOGLOBIN: CPT

## 2022-02-20 PROCEDURE — 6370000000 HC RX 637 (ALT 250 FOR IP): Performed by: INTERNAL MEDICINE

## 2022-02-20 PROCEDURE — 84145 PROCALCITONIN (PCT): CPT

## 2022-02-20 PROCEDURE — 83605 ASSAY OF LACTIC ACID: CPT

## 2022-02-20 PROCEDURE — 6360000002 HC RX W HCPCS: Performed by: INTERNAL MEDICINE

## 2022-02-20 PROCEDURE — P9035 PLATELET PHERES LEUKOREDUCED: HCPCS

## 2022-02-20 PROCEDURE — 85014 HEMATOCRIT: CPT

## 2022-02-20 PROCEDURE — 99254 IP/OBS CNSLTJ NEW/EST MOD 60: CPT | Performed by: SURGERY

## 2022-02-20 PROCEDURE — 85610 PROTHROMBIN TIME: CPT

## 2022-02-20 PROCEDURE — 85379 FIBRIN DEGRADATION QUANT: CPT

## 2022-02-20 PROCEDURE — 82140 ASSAY OF AMMONIA: CPT

## 2022-02-20 PROCEDURE — 94761 N-INVAS EAR/PLS OXIMETRY MLT: CPT

## 2022-02-20 PROCEDURE — 36430 TRANSFUSION BLD/BLD COMPNT: CPT

## 2022-02-20 PROCEDURE — 2700000000 HC OXYGEN THERAPY PER DAY

## 2022-02-20 PROCEDURE — 80053 COMPREHEN METABOLIC PANEL: CPT

## 2022-02-20 PROCEDURE — 2060000000 HC ICU INTERMEDIATE R&B

## 2022-02-20 PROCEDURE — C9113 INJ PANTOPRAZOLE SODIUM, VIA: HCPCS | Performed by: INTERNAL MEDICINE

## 2022-02-20 PROCEDURE — 36415 COLL VENOUS BLD VENIPUNCTURE: CPT

## 2022-02-20 PROCEDURE — 6360000002 HC RX W HCPCS: Performed by: NURSE PRACTITIONER

## 2022-02-20 PROCEDURE — P9016 RBC LEUKOCYTES REDUCED: HCPCS

## 2022-02-20 PROCEDURE — 85025 COMPLETE CBC W/AUTO DIFF WBC: CPT

## 2022-02-20 RX ORDER — IBUPROFEN 400 MG/1
400 TABLET ORAL ONCE
Status: COMPLETED | OUTPATIENT
Start: 2022-02-20 | End: 2022-02-20

## 2022-02-20 RX ORDER — FLUOXETINE HYDROCHLORIDE 20 MG/1
40 CAPSULE ORAL DAILY
Status: DISCONTINUED | OUTPATIENT
Start: 2022-02-20 | End: 2022-02-22 | Stop reason: HOSPADM

## 2022-02-20 RX ORDER — M-VIT,TX,IRON,MINS/CALC/FOLIC 27MG-0.4MG
1 TABLET ORAL DAILY
Status: DISCONTINUED | OUTPATIENT
Start: 2022-02-20 | End: 2022-02-22 | Stop reason: HOSPADM

## 2022-02-20 RX ORDER — SODIUM CHLORIDE 9 MG/ML
INJECTION, SOLUTION INTRAVENOUS PRN
Status: DISCONTINUED | OUTPATIENT
Start: 2022-02-20 | End: 2022-02-22 | Stop reason: HOSPADM

## 2022-02-20 RX ORDER — ARIPIPRAZOLE 10 MG/1
15 TABLET ORAL NIGHTLY
Status: DISCONTINUED | OUTPATIENT
Start: 2022-02-20 | End: 2022-02-22 | Stop reason: HOSPADM

## 2022-02-20 RX ORDER — PRAZOSIN HYDROCHLORIDE 1 MG/1
2 CAPSULE ORAL NIGHTLY
Status: DISCONTINUED | OUTPATIENT
Start: 2022-02-20 | End: 2022-02-22 | Stop reason: HOSPADM

## 2022-02-20 RX ORDER — SPIRONOLACTONE 25 MG/1
50 TABLET ORAL DAILY
Status: DISCONTINUED | OUTPATIENT
Start: 2022-02-20 | End: 2022-02-22 | Stop reason: HOSPADM

## 2022-02-20 RX ORDER — MORPHINE SULFATE 4 MG/ML
4 INJECTION, SOLUTION INTRAMUSCULAR; INTRAVENOUS EVERY 4 HOURS PRN
Status: DISCONTINUED | OUTPATIENT
Start: 2022-02-20 | End: 2022-02-22

## 2022-02-20 RX ORDER — FUROSEMIDE 20 MG/1
20 TABLET ORAL DAILY
Status: DISCONTINUED | OUTPATIENT
Start: 2022-02-20 | End: 2022-02-22 | Stop reason: HOSPADM

## 2022-02-20 RX ORDER — HYDROXYZINE PAMOATE 25 MG/1
50 CAPSULE ORAL 3 TIMES DAILY PRN
Status: DISCONTINUED | OUTPATIENT
Start: 2022-02-20 | End: 2022-02-22 | Stop reason: HOSPADM

## 2022-02-20 RX ORDER — MORPHINE SULFATE 2 MG/ML
2 INJECTION, SOLUTION INTRAMUSCULAR; INTRAVENOUS EVERY 4 HOURS PRN
Status: DISCONTINUED | OUTPATIENT
Start: 2022-02-20 | End: 2022-02-20

## 2022-02-20 RX ADMIN — SPIRONOLACTONE 50 MG: 25 TABLET ORAL at 14:15

## 2022-02-20 RX ADMIN — MORPHINE SULFATE 4 MG: 4 INJECTION INTRAVENOUS at 21:30

## 2022-02-20 RX ADMIN — MORPHINE SULFATE 2 MG: 2 INJECTION, SOLUTION INTRAMUSCULAR; INTRAVENOUS at 07:47

## 2022-02-20 RX ADMIN — ACETAMINOPHEN 650 MG: 325 TABLET ORAL at 21:29

## 2022-02-20 RX ADMIN — MORPHINE SULFATE 4 MG: 4 INJECTION INTRAVENOUS at 17:20

## 2022-02-20 RX ADMIN — INSULIN LISPRO 4 UNITS: 100 INJECTION, SOLUTION INTRAVENOUS; SUBCUTANEOUS at 17:50

## 2022-02-20 RX ADMIN — LACTULOSE 20 G: 20 SOLUTION ORAL at 07:49

## 2022-02-20 RX ADMIN — METOPROLOL TARTRATE 25 MG: 25 TABLET, FILM COATED ORAL at 21:29

## 2022-02-20 RX ADMIN — SODIUM CHLORIDE, PRESERVATIVE FREE 10 ML: 5 INJECTION INTRAVENOUS at 07:49

## 2022-02-20 RX ADMIN — ARIPIPRAZOLE 15 MG: 10 TABLET ORAL at 21:29

## 2022-02-20 RX ADMIN — HYDROXYZINE PAMOATE 50 MG: 25 CAPSULE ORAL at 21:29

## 2022-02-20 RX ADMIN — IBUPROFEN 400 MG: 400 TABLET, FILM COATED ORAL at 17:50

## 2022-02-20 RX ADMIN — HYDROXYZINE PAMOATE 50 MG: 25 CAPSULE ORAL at 10:51

## 2022-02-20 RX ADMIN — LACTULOSE 20 G: 20 SOLUTION ORAL at 14:15

## 2022-02-20 RX ADMIN — PANTOPRAZOLE SODIUM 40 MG: 40 INJECTION, POWDER, FOR SOLUTION INTRAVENOUS at 07:47

## 2022-02-20 RX ADMIN — MORPHINE SULFATE 2 MG: 2 INJECTION, SOLUTION INTRAMUSCULAR; INTRAVENOUS at 03:45

## 2022-02-20 RX ADMIN — PRAZOSIN HYDROCHLORIDE 2 MG: 1 CAPSULE ORAL at 21:29

## 2022-02-20 RX ADMIN — INSULIN GLARGINE 26 UNITS: 100 INJECTION, SOLUTION SUBCUTANEOUS at 21:44

## 2022-02-20 RX ADMIN — INSULIN LISPRO 4 UNITS: 100 INJECTION, SOLUTION INTRAVENOUS; SUBCUTANEOUS at 12:10

## 2022-02-20 RX ADMIN — METOPROLOL TARTRATE 25 MG: 25 TABLET, FILM COATED ORAL at 10:51

## 2022-02-20 RX ADMIN — MORPHINE SULFATE 4 MG: 4 INJECTION INTRAVENOUS at 10:49

## 2022-02-20 RX ADMIN — FUROSEMIDE 20 MG: 20 TABLET ORAL at 14:15

## 2022-02-20 RX ADMIN — ONDANSETRON 4 MG: 2 INJECTION INTRAMUSCULAR; INTRAVENOUS at 05:19

## 2022-02-20 RX ADMIN — ONDANSETRON 4 MG: 2 INJECTION INTRAMUSCULAR; INTRAVENOUS at 09:25

## 2022-02-20 RX ADMIN — FLUOXETINE 40 MG: 20 CAPSULE ORAL at 10:51

## 2022-02-20 RX ADMIN — SODIUM CHLORIDE, PRESERVATIVE FREE 10 ML: 5 INJECTION INTRAVENOUS at 21:30

## 2022-02-20 RX ADMIN — PIPERACILLIN AND TAZOBACTAM 3375 MG: 3; .375 INJECTION, POWDER, LYOPHILIZED, FOR SOLUTION INTRAVENOUS at 14:34

## 2022-02-20 RX ADMIN — MULTIPLE VITAMINS W/ MINERALS TAB 1 TABLET: TAB at 10:51

## 2022-02-20 RX ADMIN — INSULIN LISPRO 2 UNITS: 100 INJECTION, SOLUTION INTRAVENOUS; SUBCUTANEOUS at 07:49

## 2022-02-20 RX ADMIN — INSULIN LISPRO 2 UNITS: 100 INJECTION, SOLUTION INTRAVENOUS; SUBCUTANEOUS at 21:44

## 2022-02-20 RX ADMIN — ONDANSETRON 4 MG: 2 INJECTION INTRAMUSCULAR; INTRAVENOUS at 17:20

## 2022-02-20 RX ADMIN — PIPERACILLIN AND TAZOBACTAM 3375 MG: 3; .375 INJECTION, POWDER, LYOPHILIZED, FOR SOLUTION INTRAVENOUS at 21:36

## 2022-02-20 ASSESSMENT — PAIN DESCRIPTION - PROGRESSION: CLINICAL_PROGRESSION: GRADUALLY WORSENING

## 2022-02-20 ASSESSMENT — PAIN SCALES - GENERAL
PAINLEVEL_OUTOF10: 10
PAINLEVEL_OUTOF10: 8
PAINLEVEL_OUTOF10: 9
PAINLEVEL_OUTOF10: 0
PAINLEVEL_OUTOF10: 10

## 2022-02-20 ASSESSMENT — PAIN DESCRIPTION - LOCATION: LOCATION: SHOULDER

## 2022-02-20 ASSESSMENT — PAIN DESCRIPTION - DIRECTION: RADIATING_TOWARDS: LEFT ARM

## 2022-02-20 ASSESSMENT — PAIN DESCRIPTION - FREQUENCY: FREQUENCY: CONTINUOUS

## 2022-02-20 ASSESSMENT — PAIN DESCRIPTION - ONSET: ONSET: ON-GOING

## 2022-02-20 ASSESSMENT — PAIN - FUNCTIONAL ASSESSMENT: PAIN_FUNCTIONAL_ASSESSMENT: PREVENTS OR INTERFERES WITH MANY ACTIVE NOT PASSIVE ACTIVITIES

## 2022-02-20 ASSESSMENT — PAIN DESCRIPTION - PAIN TYPE: TYPE: ACUTE PAIN

## 2022-02-20 ASSESSMENT — PAIN DESCRIPTION - ORIENTATION: ORIENTATION: LEFT
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
## 2022-02-20 NOTE — PROGRESS NOTES
Pt alert and oriented. VSS, O2 94% on room air. Assessment completed as charted. Pt states pain tolerable at present time. Resting in bed, denies needs. Call light and bedside table within reach.

## 2022-02-20 NOTE — CONSULTS
Consult placed    Who:Dr. Wally Santos  Date:2/20/2022,  Time:10:01 AM        Electronically signed by Darío Rojas on 2/20/2022 at 10:01 AM

## 2022-02-20 NOTE — CONSULTS
Family: Not on file    Frequency of Social Gatherings with Friends and Family: Not on file    Attends [employer] Services: Not on file    Active Member of Clubs or Organizations: Not on file    Attends Club or Organization Meetings: Not on file    Marital Status: Not on file   Intimate Partner Violence:     Fear of Current or Ex-Partner: Not on file    Emotionally Abused: Not on file    Physically Abused: Not on file    Sexually Abused: Not on file   Housing Stability:     Unable to Pay for Housing in the Last Year: Not on file    Number of [place] in the Last Year: Not on file    Unstable Housing in the Last Year: Not on file     Current Facility-Administered Medications   Medication Dose Route Frequency Provider Last Rate Last Admin    morphine sulfate (PF) injection 4 mg  4 mg IntraVENous Q4H PRN Boni Amaro MD   4 mg at 02/20/22 1049    hydrOXYzine (VISTARIL) capsule 50 mg  50 mg Oral TID PRN Boni Amaro MD   50 mg at 02/20/22 1051    prazosin (MINIPRESS) capsule 2 mg  2 mg Oral Nightly Boni Amaro MD        metoprolol tartrate (LOPRESSOR) tablet 25 mg  25 mg Oral BID Boni Amaro MD   25 mg at 02/20/22 1051    ARIPiprazole (ABILIFY) tablet 15 mg  15 mg Oral Nightly Boni Amaro MD        FLUoxetine (PROZAC) capsule 40 mg  40 mg Oral Daily Boni Amaro MD   40 mg at 02/20/22 1051    therapeutic multivitamin-minerals 1 tablet  1 tablet Oral Daily Boni Amaro MD   1 tablet at 02/20/22 1051    amLODIPine (NORVASC) tablet 10 mg  10 mg Oral Daily Belén Navarro MD        insulin lispro (HUMALOG) injection vial 0-12 Units  0-12 Units SubCUTAneous TID WC Belén Navarro MD   2 Units at 02/20/22 0749    insulin lispro (HUMALOG) injection vial 0-6 Units  0-6 Units SubCUTAneous Nightly Belén Navarro MD   3 Units at 02/19/22 2020    glucose (GLUTOSE) 40 % oral gel 15 g  15 g Oral PRN Belén Navarro MD        glucagon (rDNA) injection 1 mg  1 mg IntraMUSCular PRN Mayra Guerin MD        dextrose 5 % solution  100 mL/hr IntraVENous PRN Mayra Guerin MD        sodium chloride flush 0.9 % injection 10 mL  10 mL IntraVENous 2 times per day Mayra Guerin MD   10 mL at 02/20/22 0749    sodium chloride flush 0.9 % injection 10 mL  10 mL IntraVENous PRN Mayra Guerin MD        0.9 % sodium chloride infusion  25 mL IntraVENous PRN Mayra Guerin MD        ondansetron [facility] injection 4 mg  4 mg IntraVENous Q4H PRN Mayra Guerin MD   4 mg at 02/20/22 5141    polyethylene glycol (GLYCOLAX) packet 17 g  17 g Oral Daily PRN Mayra Guerin MD        acetaminophen (TYLENOL) tablet 650 mg  650 mg Oral Q4H PRN Mayra Guerin MD        Or    acetaminophen (TYLENOL) suppository 650 mg  650 mg Rectal Q4H PRN Mayra Guerin MD        insulin glargine (LANTUS) injection vial 26 Units  0.25 Units/kg SubCUTAneous Nightly Mayra Guerin MD   26 Units at 02/19/22 2021    lisinopril (PRINIVIL;ZESTRIL) tablet 20 mg  20 mg Oral Daily Mayra Guerin MD        dextrose bolus (hypoglycemia) 10% 125 mL  125 mL IntraVENous PRN Mayra Guerin MD        Or    dextrose bolus (hypoglycemia) 10% 250 mL  250 mL IntraVENous PRN Mayra Guerin MD        pantoprazole (PROTONIX) injection 40 mg  40 mg IntraVENous Daily Tresa Alonso MD   40 mg at 02/20/22 0747    lactulose (CHRONULAC) 10 GM/15ML solution 20 g  20 g Oral TID Lida Fonseca MD   20 g at 02/20/22 0749    0.9 % sodium chloride infusion   IntraVENous PRN Mayra Guerin MD        0.9 % sodium chloride infusion   IntraVENous PRN Mayra Guerin MD         No Known Allergies    Review of Systems  Pertinent items are noted in HPI.       Objective:     BP (!) 177/92   Pulse 108   Temp 98.6 °F (37 °C) (Oral)   Resp 24   Ht 5' 8\" (1.727 m)   Wt 232 lb (105.2 kg)   SpO2 92%   BMI 35.28 kg/m²     General:  alert, appears stated age, cooperative and moderate distress Skin:  jaundiced   Eyes: Sclera icteric   Mouth: MMM no lesions   Lymph Nodes:  Cervical, supraclavicular, and axillary nodes normal.   Lungs:  clear to auscultation bilaterally   Chest: asymmetric swelling L anterior chest wall - infraclavicular associated with dense ecchymosis - tender to palpation and painful with L shoulder passive ROM. Ecchymosis extends into proximal L arm. Mass nonpulsatile but firm. High L axilla soft. L arm & hand swollen but compartments soft. Heart:  regular rate and rhythm, S1, S2 normal, no murmur, click, rub or gallop   Abdomen: Soft, nontender   CVA:  absent   Genitourinary: defer exam   Extremities:  Arm swollen as described above    Pulses:   R bruit  L bruit   2   carotid 2    2   brachial     2   radial 2    2   femoral 2    2   popliteal 2    2   posterior tibial 2    2   dorsalis pedis 2    na   bypass graft na       Neurologic:  negative   Psychiatric:  non focal       INR 2/18/2022  4.2  Not repeated today yet  HgB 7.8 yesterday    CT Chest/abd  2/18/2022 - personally reviewed: hematoma appears subpectorial - no blush seen  Impression   1. Prominent soft tissue hematomas along the left chest.  Correlate for   history of trauma and/or coagulopathy. 2. Moderate/large left pleural effusion with associated left lower lobe and   lingula atelectasis/partial collapse. 3. Scattered ground-glass opacities predominantly in the right upper lobe. Findings are nonspecific and may be related to edema, contusions, or foci of   inflammation/infection. 4. Fat containing left diaphragmatic hernia. 5. Cirrhotic morphology of the liver with associated portosystemic   collaterals and splenomegaly. 6. Small/moderate ascites. 7. Distended gallbladder with associated wall thickening and pericholecystic   fluid.  Findings may be related to ascites, though possibility of   cholecystitis is not excluded in the appropriate clinical setting. 8. Diffuse soft tissue edema.    9. Mild bilateral gynecomastia. Assessment:     1) Spontaneous L chest wall bleed with hematoma - symptomatic  2) Alcoholic cirrhosis with coagulopathy and liver failure     Rec:     Check coags today - pending  Will require correction as possible. IR coil embolization not likely possible due to lack of identifiable vessel bleeding site. Surgical I&D would be best to relieve symptoms however this would require general anesthesia which may not be well tolerated with this degree of liver failure. Possibility of effective large bore CT guided percutaneous drainage would need to be discussed with IR. No hemodynamic instability or neurologic injury. Continue correction and pain meds. Will reassess and follow. No emergent indication for intervention at this time.     Clair Flores

## 2022-02-20 NOTE — PROGRESS NOTES
Agree with assessment completed by student nurse, Carleen Boas. Verbal order from MD to hold off on platelet administration. All care needs met. Will continue to monitor.

## 2022-02-20 NOTE — PROGRESS NOTES
Page to MD to notify of panic H&H and increasing WBC, awaiting response. (0) understands/communicates without difficulty

## 2022-02-20 NOTE — PROGRESS NOTES
Hospitalist Progress Note      PCP: Gely Tom    Date of Admission: 2/18/2022    Chief Complaint:   Abd and chest wall pain    Hospital Course:     Pt is an 49y.o. year-old male with a history of hypertension and alcoholic cirrhosis of the liver. He presents to the emergency room at the request of his primary care physician for evaluation of abnormal labs. He does not know which labs were abnormal.  He also complains of generalized abdominal pain and left-sided pain. He does have an extensive area of ecchymosis involving his entire left side of his trunk with swelling extending into his left arm. Upon evaluation he was found to have a hemoglobin of 6.9, a platelet count of 47, a large pleural effusion on the left side, abnormal liver function tests and a glucose level of 392. He has no prior history of diabetes mellitus. He is being admitted for further evaluation and treatment. Associated signs and symptoms do not include fever or chills, nausea, vomiting, hemoptysis, hematochezia, diarrhea, constipation or urinary symptoms. Patient diagnosed with EtOH related liver disease in 2021. 2/1/2022 Tbili = 14.5. Patient was admitted on 2/1 for hematoma in chest and coagulopathy. Patient with progressive ecchymoses and pain in left chest over past week. No recent trauma. No melena or BRBPR. No nausea. Patient denies seizure. He was seeing Dr. Royal Duran as outpatient. Subjective:     Patient still with chest and arm pain. No emesis. No melena noted. Pain not well controlled.      Medications:  Reviewed    Infusion Medications    dextrose      sodium chloride      sodium chloride      sodium chloride       Scheduled Medications    prazosin  2 mg Oral Nightly    metoprolol tartrate  25 mg Oral BID    ARIPiprazole  15 mg Oral Nightly    FLUoxetine  40 mg Oral Daily    therapeutic multivitamin-minerals  1 tablet Oral Daily    amLODIPine  10 mg Oral Daily    insulin lispro  0-12 Units SubCUTAneous TID     insulin lispro  0-6 Units SubCUTAneous Nightly    sodium chloride flush  10 mL IntraVENous 2 times per day    insulin glargine  0.25 Units/kg SubCUTAneous Nightly    lisinopril  20 mg Oral Daily    pantoprazole  40 mg IntraVENous Daily    lactulose  20 g Oral TID     PRN Meds: morphine, hydrOXYzine, glucose, glucagon (rDNA), dextrose, sodium chloride flush, sodium chloride, ondansetron, polyethylene glycol, acetaminophen **OR** acetaminophen, dextrose bolus (hypoglycemia) **OR** dextrose bolus (hypoglycemia), sodium chloride, sodium chloride      Intake/Output Summary (Last 24 hours) at 2/20/2022 1154  Last data filed at 2/19/2022 1356  Gross per 24 hour   Intake 360 ml   Output --   Net 360 ml       Physical Exam Performed:    /75   Pulse 92   Temp 98.1 °F (36.7 °C) (Oral)   Resp 22   Ht 5' 8\" (1.727 m)   Wt 232 lb (105.2 kg)   SpO2 92%   BMI 35.28 kg/m²     General appearance: Moderate distress, appears stated age and cooperative. Sitting up on side of bed. HEENT: Pupils equal, round, and reactive to light. Conjunctivae/corneas clear. Neck: Supple, with full range of motion. No jugular venous distention. Trachea midline. Respiratory:  Normal respiratory effort. Clear to auscultation, bilaterally without Rales/Wheezes/Rhonchi. Breath sound diminished left chest.  Cardiovascular: Regular rate and rhythm with normal S1/S2 without murmurs, rubs or gallops. Abdomen: Distended and tender. Hypoactive bowel sounds. Musculoskeletal: Edema left arm. Good radial pulse. Sensation and cap refill normal.   Skin: See skin assessment photos. Extensive ecchymoses anterior chest L>R and abdomen. Ecchymoses dependent posterior buttock. No scrotal edema. Edema in left chest and arm. 4+ edema in left arm. Radial pulse palpated. Neurologic:  Neurovascularly intact without any focal sensory/motor deficits.  Cranial nerves: II-XII intact, grossly non-focal.  Psychiatric: Alert and oriented, thought content appropriate, normal insight  Capillary Refill: Brisk,3 seconds, normal   Peripheral Pulses: +2 palpable, equal bilaterally       Labs:   Recent Labs     02/18/22 1920 02/19/22  0559   WBC 12.3* 17.5*   HGB 6.9* 7.8*   HCT 20.1* 22.1*   PLT 47* 54*     Recent Labs     02/18/22 1920 02/19/22  0559   * 136   K 4.8 4.3    103   CO2 19* 22   BUN 14 15   CREATININE <0.5* <0.5*   CALCIUM 8.8 8.6     Recent Labs     02/18/22 1920 02/19/22  0559   * 158*   ALT 54* 48*   BILITOT 26.5* 27.1*   ALKPHOS 139* 129     Recent Labs     02/18/22 1920   INR 4.18*     Recent Labs     02/18/22 1920   TROPONINI <0.01       Urinalysis:      Lab Results   Component Value Date    NITRU Negative 02/18/2022    WBCUA 0-2 02/18/2022    RBCUA 0-2 02/18/2022    BLOODU TRACE-INTACT 02/18/2022    SPECGRAV 1.015 02/18/2022    GLUCOSEU >=1000 02/18/2022       Radiology:  CT CHEST ABDOMEN PELVIS W CONTRAST   Final Result   1. Prominent soft tissue hematomas along the left chest.  Correlate for   history of trauma and/or coagulopathy. 2. Moderate/large left pleural effusion with associated left lower lobe and   lingula atelectasis/partial collapse. 3. Scattered ground-glass opacities predominantly in the right upper lobe. Findings are nonspecific and may be related to edema, contusions, or foci of   inflammation/infection. 4. Fat containing left diaphragmatic hernia. 5. Cirrhotic morphology of the liver with associated portosystemic   collaterals and splenomegaly. 6. Small/moderate ascites. 7. Distended gallbladder with associated wall thickening and pericholecystic   fluid. Findings may be related to ascites, though possibility of   cholecystitis is not excluded in the appropriate clinical setting. 8. Diffuse soft tissue edema. 9. Mild bilateral gynecomastia.                  Assessment/Plan:    Active Hospital Problems    Diagnosis     Hematoma of left chest wall [Y30.444S]     Alcoholic cirrhosis of liver ([facility] [K70.30]     Essential hypertension [I10]     Coagulopathy ([facility] [D68.9]     Thrombocytopenia ([facility] [D69.6]     Splenomegaly [R16.1]     New onset type 2 diabetes mellitus ([facility] [E11.9]     Transaminitis [R74.01]     Hyperbilirubinemia [E80.6]     Elevated alkaline phosphatase level [R74.8]     Pleural effusion on left [J90]     Hypoalbuminemia [E88.09]     Ascites due to alcoholic cirrhosis ([facility] [N02.07]     Hyperammonemia (HCC) [E72.20]     Alcohol abuse [F10.10]     Portal hypertension ([facility] [K76.6]     Gynecomastia, male [N62]     Compression atelectasis [J98.11]     Elevated lipase [R74.8]     Class 2 obesity in adult [E66.9]     Suspected sleep apnea [R29.818]     Acute anemia [D64.9]      1. Anemia - Secondary to blood loss. Subcutaneous hematoma and possible GI bleed. Consulted GI. Correct coagulopathy. May need FFP if bleeding persists. 2. Coagulopathy secondary to liver disease - Monitor INR. Vit K given. Check INR   3. Alcohol related liver cirrhosis - Minimal ascites. GI consulted. Monitor ammonia level. MELD score 35  4. Left pleural effusion - Pulmonary consulted. Hold on thoracentesis. 5. Thrombocytopenia - Secondary to liver disease. Splenomegaly noted. Poor prognosis MELD score 35. May need palliative care consult. Called  transfer center. They do not have bed available. Per discussion yesterday,  hepatology did not feel patient met criteria for transfer. Will call back if patient decompensates. DVT Prophylaxis: Coagulopathy  Diet: ADULT DIET; Regular; 5 carb choices (75 gm/meal); Low Sodium (2 gm)  ADULT ORAL NUTRITION SUPPLEMENT; Lunch, Dinner, Breakfast; Diabetic Oral Supplement  Code Status: Full Code    PT/OT Eval Status: Pending    Dispo - Acutely ill. Home when stable.     Alisa Arechiga MD

## 2022-02-21 LAB
A/G RATIO: 1 (ref 1.1–2.2)
ALBUMIN SERPL-MCNC: 2.6 G/DL (ref 3.4–5)
ALP BLD-CCNC: 81 U/L (ref 40–129)
ALT SERPL-CCNC: 36 U/L (ref 10–40)
AMMONIA: 58 UMOL/L (ref 16–60)
ANION GAP SERPL CALCULATED.3IONS-SCNC: 13 MMOL/L (ref 3–16)
ANISOCYTOSIS: ABNORMAL
AST SERPL-CCNC: 142 U/L (ref 15–37)
BANDED NEUTROPHILS RELATIVE PERCENT: 20 % (ref 0–7)
BASOPHILS ABSOLUTE: 0 K/UL (ref 0–0.2)
BASOPHILS RELATIVE PERCENT: 0 %
BILIRUB SERPL-MCNC: 34.2 MG/DL (ref 0–1)
BLOOD BANK DISPENSE STATUS: NORMAL
BLOOD BANK PRODUCT CODE: NORMAL
BPU ID: NORMAL
BUN BLDV-MCNC: 34 MG/DL (ref 7–20)
CALCIUM SERPL-MCNC: 8.9 MG/DL (ref 8.3–10.6)
CHLORIDE BLD-SCNC: 98 MMOL/L (ref 99–110)
CO2: 21 MMOL/L (ref 21–32)
CREAT SERPL-MCNC: <0.5 MG/DL (ref 0.9–1.3)
CRENATED RBC'S: ABNORMAL
DESCRIPTION BLOOD BANK: NORMAL
EOSINOPHILS ABSOLUTE: 0.7 K/UL (ref 0–0.6)
EOSINOPHILS RELATIVE PERCENT: 4 %
ESTIMATED AVERAGE GLUCOSE: 62.4 MG/DL
GFR AFRICAN AMERICAN: >60
GFR NON-AFRICAN AMERICAN: >60
GLUCOSE BLD-MCNC: 155 MG/DL (ref 70–99)
GLUCOSE BLD-MCNC: 167 MG/DL (ref 70–99)
GLUCOSE BLD-MCNC: 183 MG/DL (ref 70–99)
GLUCOSE BLD-MCNC: 188 MG/DL (ref 70–99)
GLUCOSE BLD-MCNC: 212 MG/DL (ref 70–99)
GLUCOSE BLD-MCNC: 234 MG/DL (ref 70–99)
HBA1C MFR BLD: 3.8 %
HCT VFR BLD CALC: 18.5 % (ref 40.5–52.5)
HEMATOLOGY PATH CONSULT: NO
HEMATOLOGY PATH CONSULT: NORMAL
HEMOGLOBIN: 6.5 G/DL (ref 13.5–17.5)
HYPOCHROMIA: ABNORMAL
INR BLD: 3.28 (ref 0.88–1.12)
LYMPHOCYTES ABSOLUTE: 0.6 K/UL (ref 1–5.1)
LYMPHOCYTES RELATIVE PERCENT: 3 %
MACROCYTES: ABNORMAL
MCH RBC QN AUTO: 35.5 PG (ref 26–34)
MCHC RBC AUTO-ENTMCNC: 35.2 G/DL (ref 31–36)
MCV RBC AUTO: 100.8 FL (ref 80–100)
METAMYELOCYTES RELATIVE PERCENT: 1 %
MICROCYTES: ABNORMAL
MONOCYTES ABSOLUTE: 0.4 K/UL (ref 0–1.3)
MONOCYTES RELATIVE PERCENT: 2 %
NEUTROPHILS ABSOLUTE: 16.8 K/UL (ref 1.7–7.7)
NEUTROPHILS RELATIVE PERCENT: 70 %
NUCLEATED RED BLOOD CELLS: 1 /100 WBC
OVALOCYTES: ABNORMAL
PDW BLD-RTO: 26.9 % (ref 12.4–15.4)
PERFORMED ON: ABNORMAL
PLATELET # BLD: 25 K/UL (ref 135–450)
PLATELET SLIDE REVIEW: ABNORMAL
PMV BLD AUTO: 9.3 FL (ref 5–10.5)
POIKILOCYTES: ABNORMAL
POLYCHROMASIA: ABNORMAL
POTASSIUM REFLEX MAGNESIUM: 4.4 MMOL/L (ref 3.5–5.1)
PROTHROMBIN TIME: 38.9 SEC (ref 9.9–12.7)
RBC # BLD: 1.84 M/UL (ref 4.2–5.9)
REASON FOR REJECTION: NORMAL
REJECTED TEST: NORMAL
SCHISTOCYTES: ABNORMAL
SLIDE REVIEW: ABNORMAL
SODIUM BLD-SCNC: 132 MMOL/L (ref 136–145)
TARGET CELLS: ABNORMAL
TOTAL PROTEIN: 5.1 G/DL (ref 6.4–8.2)
TOXIC GRANULATION: PRESENT
WBC # BLD: 18.5 K/UL (ref 4–11)

## 2022-02-21 PROCEDURE — 36415 COLL VENOUS BLD VENIPUNCTURE: CPT

## 2022-02-21 PROCEDURE — 82140 ASSAY OF AMMONIA: CPT

## 2022-02-21 PROCEDURE — 36430 TRANSFUSION BLD/BLD COMPNT: CPT

## 2022-02-21 PROCEDURE — P9016 RBC LEUKOCYTES REDUCED: HCPCS

## 2022-02-21 PROCEDURE — 6370000000 HC RX 637 (ALT 250 FOR IP): Performed by: INTERNAL MEDICINE

## 2022-02-21 PROCEDURE — 85025 COMPLETE CBC W/AUTO DIFF WBC: CPT

## 2022-02-21 PROCEDURE — P9017 PLASMA 1 DONOR FRZ W/IN 8 HR: HCPCS

## 2022-02-21 PROCEDURE — 2580000003 HC RX 258: Performed by: INTERNAL MEDICINE

## 2022-02-21 PROCEDURE — 85610 PROTHROMBIN TIME: CPT

## 2022-02-21 PROCEDURE — 94761 N-INVAS EAR/PLS OXIMETRY MLT: CPT

## 2022-02-21 PROCEDURE — 6360000002 HC RX W HCPCS: Performed by: INTERNAL MEDICINE

## 2022-02-21 PROCEDURE — 2060000000 HC ICU INTERMEDIATE R&B

## 2022-02-21 PROCEDURE — 80053 COMPREHEN METABOLIC PANEL: CPT

## 2022-02-21 PROCEDURE — C9113 INJ PANTOPRAZOLE SODIUM, VIA: HCPCS | Performed by: INTERNAL MEDICINE

## 2022-02-21 PROCEDURE — 2700000000 HC OXYGEN THERAPY PER DAY

## 2022-02-21 RX ORDER — SODIUM CHLORIDE 9 MG/ML
INJECTION, SOLUTION INTRAVENOUS PRN
Status: DISCONTINUED | OUTPATIENT
Start: 2022-02-21 | End: 2022-02-22 | Stop reason: HOSPADM

## 2022-02-21 RX ADMIN — INSULIN LISPRO 1 UNITS: 100 INJECTION, SOLUTION INTRAVENOUS; SUBCUTANEOUS at 23:10

## 2022-02-21 RX ADMIN — MORPHINE SULFATE 4 MG: 4 INJECTION INTRAVENOUS at 02:35

## 2022-02-21 RX ADMIN — METOPROLOL TARTRATE 25 MG: 25 TABLET, FILM COATED ORAL at 09:55

## 2022-02-21 RX ADMIN — INSULIN LISPRO 2 UNITS: 100 INJECTION, SOLUTION INTRAVENOUS; SUBCUTANEOUS at 08:14

## 2022-02-21 RX ADMIN — ARIPIPRAZOLE 15 MG: 10 TABLET ORAL at 22:24

## 2022-02-21 RX ADMIN — LACTULOSE 20 G: 20 SOLUTION ORAL at 09:54

## 2022-02-21 RX ADMIN — PIPERACILLIN AND TAZOBACTAM 3375 MG: 3; .375 INJECTION, POWDER, LYOPHILIZED, FOR SOLUTION INTRAVENOUS at 06:49

## 2022-02-21 RX ADMIN — MORPHINE SULFATE 4 MG: 4 INJECTION INTRAVENOUS at 08:33

## 2022-02-21 RX ADMIN — MORPHINE SULFATE 4 MG: 4 INJECTION INTRAVENOUS at 13:57

## 2022-02-21 RX ADMIN — LACTULOSE 20 G: 20 SOLUTION ORAL at 22:26

## 2022-02-21 RX ADMIN — PIPERACILLIN AND TAZOBACTAM 3375 MG: 3; .375 INJECTION, POWDER, LYOPHILIZED, FOR SOLUTION INTRAVENOUS at 22:31

## 2022-02-21 RX ADMIN — PANTOPRAZOLE SODIUM 40 MG: 40 INJECTION, POWDER, FOR SOLUTION INTRAVENOUS at 09:54

## 2022-02-21 RX ADMIN — FUROSEMIDE 20 MG: 20 TABLET ORAL at 09:55

## 2022-02-21 RX ADMIN — SODIUM CHLORIDE, PRESERVATIVE FREE 10 ML: 5 INJECTION INTRAVENOUS at 09:55

## 2022-02-21 RX ADMIN — HYDROXYZINE PAMOATE 50 MG: 25 CAPSULE ORAL at 08:33

## 2022-02-21 RX ADMIN — MULTIPLE VITAMINS W/ MINERALS TAB 1 TABLET: TAB at 09:55

## 2022-02-21 RX ADMIN — LACTULOSE 20 G: 20 SOLUTION ORAL at 13:30

## 2022-02-21 RX ADMIN — MORPHINE SULFATE 4 MG: 4 INJECTION INTRAVENOUS at 18:43

## 2022-02-21 RX ADMIN — FLUOXETINE 40 MG: 20 CAPSULE ORAL at 09:55

## 2022-02-21 RX ADMIN — INSULIN GLARGINE 26 UNITS: 100 INJECTION, SOLUTION SUBCUTANEOUS at 23:10

## 2022-02-21 RX ADMIN — PIPERACILLIN AND TAZOBACTAM 3375 MG: 3; .375 INJECTION, POWDER, LYOPHILIZED, FOR SOLUTION INTRAVENOUS at 13:38

## 2022-02-21 RX ADMIN — SODIUM CHLORIDE, PRESERVATIVE FREE 10 ML: 5 INJECTION INTRAVENOUS at 21:21

## 2022-02-21 RX ADMIN — INSULIN LISPRO 4 UNITS: 100 INJECTION, SOLUTION INTRAVENOUS; SUBCUTANEOUS at 13:38

## 2022-02-21 RX ADMIN — SPIRONOLACTONE 50 MG: 25 TABLET ORAL at 09:55

## 2022-02-21 ASSESSMENT — PAIN DESCRIPTION - PAIN TYPE
TYPE: ACUTE PAIN

## 2022-02-21 ASSESSMENT — PAIN DESCRIPTION - DIRECTION
RADIATING_TOWARDS: L ARM

## 2022-02-21 ASSESSMENT — PAIN DESCRIPTION - LOCATION
LOCATION: SHOULDER;FLANK
LOCATION: SHOULDER
LOCATION: SHOULDER

## 2022-02-21 ASSESSMENT — PAIN DESCRIPTION - ORIENTATION
ORIENTATION: LEFT

## 2022-02-21 ASSESSMENT — PAIN SCALES - GENERAL
PAINLEVEL_OUTOF10: 8
PAINLEVEL_OUTOF10: 10
PAINLEVEL_OUTOF10: 8
PAINLEVEL_OUTOF10: 8
PAINLEVEL_OUTOF10: 0
PAINLEVEL_OUTOF10: 0
PAINLEVEL_OUTOF10: 4
PAINLEVEL_OUTOF10: 8
PAINLEVEL_OUTOF10: 8
PAINLEVEL_OUTOF10: 4
PAINLEVEL_OUTOF10: 0

## 2022-02-21 ASSESSMENT — PAIN SCALES - WONG BAKER: WONGBAKER_NUMERICALRESPONSE: 0

## 2022-02-21 NOTE — CONSULTS
Consult placed    Who: Ashley Lazo  Date:2/21/2022,  Time:12:58 PM        Electronically signed by Mira Estrella RN on 2/21/2022 at 12:58 PM

## 2022-02-21 NOTE — PROGRESS NOTES
Pt MEWs high during morning assessment, case discussed with MD. C/o pain, see eMAR for meds given. Will continue to monitor.

## 2022-02-21 NOTE — PROGRESS NOTES
One unit of blood ordered and given, pt tolerated. FFP started, running at 65ml/hr per Vascular MD order to infuse over 4 hours. Pt now spiking fevers, respirations elevated and requiring oxygen, arm swelling worsening throughout shift. Pt also has new c/o abd pain on top of arm/chest pain, not well controlled. Page to tevin [place] to discuss case. Transfer orders placed for progressive care. Pt moved to room C422, lab was unable to draw lactic/post transfusion H&H, nurse notified during bedside report.

## 2022-02-21 NOTE — PROGRESS NOTES
/67   Pulse 75   Temp 97.9 °F (36.6 °C) (Oral)   Resp 18   Ht 5' 8\" (1.727 m)   Wt 232 lb (105.2 kg)   SpO2 97%   BMI 35.28 kg/m²  on 2L O2 per nasal cannula. Platelets infusing at this time.   Vlad Ramirez RN  2/21/2022

## 2022-02-21 NOTE — CARE COORDINATION
Patient has critical H&H today of 6.5/18.5 RN perfect served MD for orders. Will likely need transfusion. GI following. HX of ETOH abuse and cirrhosis/ hepatitis/ liver failure. Currently getting fresh frozen plasma. Large left hematoma extending below his umbilicus and flank. GI recommends palliative care consult for goals for care related to chronic disease. Patient not a transplant candidate. Lives with fiancee in 3rd floor apartment and was IPTA per initial CM assessment. Will follow and assist with any discharge planning needs.

## 2022-02-21 NOTE — PROGRESS NOTES
Pt received unit of platelets, vitals were stable. Current vitals /71   Pulse 73   Temp 98.6 °F (37 °C) (Oral)   Resp 18   Ht 5' 8\" (1.727 m)   Wt 232 lb (105.2 kg)   SpO2 95%   BMI 35.28 kg/m²  on 2L nasal cannula. Unit of blood started.   Nubia Fraser, [place]  2/21/2022

## 2022-02-21 NOTE — PROGRESS NOTES
Progress Note    Patient Lynette Eubanks  MRN: [id number]  YOB: 1975 Age: 49 y.o. Sex: male  Room: [address] [place] [id number]       Admitting Physician: Caitlyn Terrell MD   Date of Admission: 2/18/2022  6:20 PM   Primary Care Physician: 535Nataliia Martin     Subjective:  Lynette Eubanks was seen and examined. We are following for EtOH cirrhosis/hepatitis, liver failure. -- Patient febrile yesterday afternoon--T Max 101.5 F  -- Complains of left chest and arm pain 2/2 hematoma  -- Currently receiving FFP  -- Having 3-4 BMs per day  -- Denies overt GI bleeding    ROS:  Constitutional: Denies fever, no change in appetite  Respiratory: Denies cough or shortness of breath  Cardiovascular: Denies chest pain or edema    Objective:  Vital Signs:   Vitals:    02/21/22 1125   BP: 115/61   Pulse: 75   Resp: 18   Temp: 97.6 °F (36.4 °C)   SpO2: 91%         Physical Exam:  Constitutional: Alert and oriented x 4. No acute distress. Respiratory: Respirations nonlabored, no crepitus. Large left chest hematoma extending below his umbilicus and flank  GI: Abdomen nondistended, soft, and nontender. Neurological: No focal deficits noted. Trivial asterixis.     Intake/Output:    Intake/Output Summary (Last 24 hours) at 2/21/2022 1139  Last data filed at 2/21/2022 1127  Gross per 24 hour   Intake 1633.58 ml   Output 0 ml   Net 1633.58 ml        Current Medications:  Current Facility-Administered Medications   Medication Dose Route Frequency Provider Last Rate Last Admin    morphine sulfate (PF) injection 4 mg  4 mg IntraVENous Q4H PRN Radha Tian MD   4 mg at 02/21/22 1061    hydrOXYzine (VISTARIL) capsule 50 mg  50 mg Oral TID PRN Radha Tian MD   50 mg at 02/21/22 3412    prazosin (MINIPRESS) capsule 2 mg  2 mg Oral Nightly Radha Tian MD   2 mg at 02/20/22 2129    metoprolol tartrate (LOPRESSOR) tablet 25 mg  25 mg Oral BID Radha Tian MD   25 mg at 02/21/22 0955    ARIPiprazole (ABILIFY) tablet 15 mg  15 mg Oral Nightly Sophie Sheldon MD   15 mg at 02/20/22 2129    FLUoxetine (PROZAC) capsule 40 mg  40 mg Oral Daily Sophie Sheldon MD   40 mg at 02/21/22 4316    therapeutic multivitamin-minerals 1 tablet  1 tablet Oral Daily Sophie Sheldon MD   1 tablet at 02/21/22 6326    furosemide (LASIX) tablet 20 mg  20 mg Oral Daily Franc León MD   20 mg at 02/21/22 6439    spironolactone (ALDACTONE) tablet 50 mg  50 mg Oral Daily Franc León MD   50 mg at 02/21/22 0955    0.9 % sodium chloride infusion   IntraVENous PRN Sophie Sheldon MD        piperacillin-tazobactam (ZOSYN) 3,375 mg in dextrose 5 % 100 mL IVPB extended infusion (mini-bag)  3,375 mg IntraVENous Q8H Sophie Sheldon MD   Stopped at 02/21/22 1100    0.9 % sodium chloride infusion   IntraVENous PRN Santo Delarosa MD        amLODIPine (NORVASC) tablet 10 mg  10 mg Oral Daily Nasima Wade MD        insulin lispro (HUMALOG) injection vial 0-12 Units  0-12 Units SubCUTAneous TID WC Nasima Wade MD   2 Units at 02/21/22 6694    insulin lispro (HUMALOG) injection vial 0-6 Units  0-6 Units SubCUTAneous Nightly Nasima Wade MD   2 Units at 02/20/22 2144    glucose (GLUTOSE) 40 % oral gel 15 g  15 g Oral PRN Nasima Wade MD        glucagon (rDNA) injection 1 mg  1 mg IntraMUSCular PRN Nasima Wade MD        dextrose 5 % solution  100 mL/hr IntraVENous PRN Nasima Wade MD        sodium chloride flush 0.9 % injection 10 mL  10 mL IntraVENous 2 times per day Nasima Wade MD   10 mL at 02/21/22 0955    sodium chloride flush 0.9 % injection 10 mL  10 mL IntraVENous PRN Nasima Wade MD        0.9 % sodium chloride infusion  25 mL IntraVENous PRN Nasima Wade MD        ondansetron [facility]) injection 4 mg  4 mg IntraVENous Q4H PRN Nasima Wade MD   4 mg at 02/20/22 1720    polyethylene glycol (GLYCOLAX) packet 17 g  17 g Oral Daily PRN Nasima Wade MD        acetaminophen (TYLENOL) tablet 650 mg  650 mg Oral Q4H PRN Praful Mendoza MD   650 mg at 02/20/22 2129    Or    acetaminophen (TYLENOL) suppository 650 mg  650 mg Rectal Q4H PRN Praful Mendoza MD        insulin glargine (LANTUS) injection vial 26 Units  0.25 Units/kg SubCUTAneous Nightly Praful Mendoza MD   26 Units at 02/20/22 2144    lisinopril (PRINIVIL;ZESTRIL) tablet 20 mg  20 mg Oral Daily Praful Mendoza MD        dextrose bolus (hypoglycemia) 10% 125 mL  125 mL IntraVENous PRN Praful Mendoza MD        Or    dextrose bolus (hypoglycemia) 10% 250 mL  250 mL IntraVENous PRN Praful Mendoza MD        pantoprazole (PROTONIX) injection 40 mg  40 mg IntraVENous Daily Enma Briseno MD   40 mg at 02/21/22 0954    lactulose (CHRONULAC) 10 GM/15ML solution 20 g  20 g Oral TID Dot Peña MD   20 g at 02/21/22 0954    0.9 % sodium chloride infusion   IntraVENous PRROSELIA Mendoza MD        0.9 % sodium chloride infusion   IntraVENous PRN Praful Mendoza MD             Recent labs and imaging reviewed. Assessment:    58-year-old male with history ETOH cirrhosis with recent hospitalization at [facility] for acute alcoholic hepatitis. Recently saw me in clinic as hospital follow-up to establish care. He was treated with prednisolone for acute alcoholic hepatitis. Follow-up labs after our visit demonstrated worsening synthetic function with an INR greater than 5 and a rising meld score. Severe synthetic decompensation. He was directed to be admitted due to worsening labs as well as progressive shortness of breath which is likely multifactorial but does note a large pleural effusion and volume overload. He also had an extensive hematoma on his left chest with significant swelling of the entire upper extremity. He has unfortunately not responded to prednisolone based on Lille score (0.776). He is not a transplant candidate due to continued alcohol abuse.   There does appear to be a familial history of significant liver disease especially at young ages however due to his alcohol abuse he is not a candidate. He was drinking heavy amounts since his incarceration 2 years ago. There is initially report of hepatitis C causing his cirrhosis in addition although his most recent antibody was negative overall very poor prognosis which was discussed with the patient and his girlfriend on 2 different occasions with Dr. Maciel Becerra. CT with no acute findings, noted ascites, large pleural effusion, gallbladder edema from liver disease, and signs of portal hypertension. Infectious work up has been unremarkable thus far. Vascular surgery consulted given spontaneous hematoma that continues to worsen and patient is symptomatic. Complains of pain and has significant swelling of the LUE. Plan:  Continue lactulose 20 mg three times daily--titrate for 3-4 BMs per day  Continue low dose diuretics  High protein, low sodium diet  Follow up vascular surgery plans--appreciate their assistance. Patient would be okay to receive propofol sedation from a liver standpoint.  has been consulted for advance directives. Would consider palliative care consult for discussion of chronic disease and goals of care  Patient is not a transplant candidate given recent EtOH use  Supportive care       Graciella Nyhan, [address] [place] [address].  Also available via [employer]

## 2022-02-21 NOTE — PROGRESS NOTES
Pt has not voided since writer has been here this shift. Bladder scan volume showing 472mL. Dr. Todd Garcia at bedside. If pt unable to void order to straight cath by placing a ureña catheter and if get 350mL out, to keep in place.   Petty Montesinos RN  2/21/2022

## 2022-02-21 NOTE — ACP (ADVANCE CARE PLANNING)
ADVANCED CARE PLANNING    Garrie Ormond       :  1975              MRN:  [id number]  Admission Date: 2022  6:20 PM  Date of Discussion:  2022    Purpose of Encounter: Advanced care planning in light of end stage liver disease  Parties in attendance: :Cliff Novoa MD, Family members: Significant other  Decisional Capacity:Yes      Objective/Medical Story:   Pt is an 49y.o. year-old male with a history of hypertension and alcoholic cirrhosis of the liver. [facility] presents to the emergency room at the request of his primary care physician for evaluation of abnormal labs. [facility] does not know which labs were abnormal.  He also complains of generalized abdominal pain and left-sided pain. [facility] does have an extensive area of ecchymosis involving his entire left side of his trunk with swelling extending into his left arm.  Upon evaluation he was found to have a hemoglobin of 6.9, a platelet count of 47, a large pleural effusion on the left side, abnormal liver function tests and a glucose level of 392.  He has no prior history of diabetes mellitus. [facility] is being admitted for further evaluation and treatment. Associated signs and symptoms do not include fever or chills, nausea, vomiting, hemoptysis, hematochezia, diarrhea, constipation or urinary symptoms.     Patient diagnosed with EtOH related liver disease in . 2022 Tbili = 14.5. Patient was admitted on  for hematoma in chest and coagulopathy. Patient with progressive ecchymoses and pain in left chest over past week. No recent trauma. No melena or BRBPR. No nausea. Patient denies seizure. He was seeing Dr. Faith Golden as outpatient.       FFP ordered   Platelet transfusion ordered. Palliative care consult. Hem/onc consult. Patient with progressive lethargy and confusion. Active Diagnoses:     Active Hospital Problems    Diagnosis Date Noted    Hematoma of left chest wall [H04.355M]     Alcoholic cirrhosis of liver ([facility] [K70.30] 02/19/2022    Essential hypertension [I10] 02/19/2022    Coagulopathy ([facility] [facility] [D68.9] 02/19/2022    Thrombocytopenia ([facility] [facility] [D69.6] 02/19/2022    Splenomegaly [R16.1] 02/19/2022    New onset type 2 diabetes mellitus ([facility] [facility] [E11.9] 02/19/2022    Transaminitis [R74.01] 02/19/2022    Hyperbilirubinemia [E80.6] 02/19/2022    Elevated alkaline phosphatase level [R74.8] 02/19/2022    Pleural effusion on left [J90] 02/19/2022    Hypoalbuminemia [E88.09] 02/19/2022    Ascites due to alcoholic cirrhosis ([facility] [address] [H61.83] 02/19/2022    Hyperammonemia ([facility] [address] [E72.20] 02/19/2022    Alcohol abuse [F10.10] 02/19/2022    Portal hypertension ([facility] [address] [K76.6] 02/19/2022    Gynecomastia, male Kim Medal 02/19/2022    Compression atelectasis [J98.11] 02/19/2022    Elevated lipase [R74.8] 02/19/2022    Class 2 obesity in adult [E66.9] 02/19/2022    Suspected sleep apnea [R29.818] 02/19/2022    Acute anemia [D64.9] 02/18/2022       These active diagnoses are of sufficient risk that focused discussion on advance care planning is indicated in order to allow the patient to thoughtfully consider personal goals of care; and if situations arise that prevent the ability to personally give input, to ensure appropriate representation of their personal desires for different levels and agressiveness of care. Goals of Care Determinations: Patient wishes to focus on treatment but would like to discuss further with palliative care. Code Status: At this time patient wishes to be Full code. Discussion with patient's SO/decision maker. Discussed the poor prognosis and change in status. Discussed code status. She states she will consider change in code status after she meets with palliative care. She does not want him to suffer but at this time she would like to continue the current treatments. Consult placed for palliative care. Time Spent on Advanced Planning Documents: 16 mins.     The following items were considered in medical decision making:  Independent review of images , Review / order clinical lab tests. Review / order radiology tests, Decision to obtain old records. Advanced Care Planning Documents:   Completed advances directives, advanced directives in chart. Electronically signed by Madhu Rodriguez MD on 2/21/2022 at 4:14 PM    Thank you [id number] for the opportunity to be involved in this patient's care. If you have any questions or concerns please feel free to contact me at [phone].

## 2022-02-21 NOTE — PROGRESS NOTES
Writer received call from lab, critical H&H 6.5/18.5. Writer sent message to Dr. Santhosh Christine regarding.   Kishor Dubon RN  2/21/2022

## 2022-02-21 NOTE — PROGRESS NOTES
Hospitalist Progress Note      PCP: Eri Penn    Date of Admission: 2/18/2022    Chief Complaint:   Abd and chest wall pain    Hospital Course:     Pt is an 49y.o. year-old male with a history of hypertension and alcoholic cirrhosis of the liver. He presents to the emergency room at the request of his primary care physician for evaluation of abnormal labs. He does not know which labs were abnormal.  He also complains of generalized abdominal pain and left-sided pain. He does have an extensive area of ecchymosis involving his entire left side of his trunk with swelling extending into his left arm. Upon evaluation he was found to have a hemoglobin of 6.9, a platelet count of 47, a large pleural effusion on the left side, abnormal liver function tests and a glucose level of 392. He has no prior history of diabetes mellitus. He is being admitted for further evaluation and treatment. Associated signs and symptoms do not include fever or chills, nausea, vomiting, hemoptysis, hematochezia, diarrhea, constipation or urinary symptoms. Patient diagnosed with EtOH related liver disease in 2021. 2/1/2022 Tbili = 14.5. Patient was admitted on 2/1 for hematoma in chest and coagulopathy. Patient with progressive ecchymoses and pain in left chest over past week. No recent trauma. No melena or BRBPR. No nausea. Patient denies seizure. He was seeing Dr. Maciel Becerra as outpatient. 2/20 FFP ordered  2/21 Platelet transfusion ordered. Palliative care consult. Hem/onc consult. Subjective:     Patient states he was feeling better. Persistent pain and edema left chest and arm. No numbness in left arm. No melena noted.      Medications:  Reviewed    Infusion Medications    sodium chloride      sodium chloride      sodium chloride      sodium chloride      dextrose      sodium chloride      sodium chloride      sodium chloride       Scheduled Medications    prazosin  2 mg Oral Nightly    metoprolol tartrate  25 mg Oral BID    ARIPiprazole  15 mg Oral Nightly    FLUoxetine  40 mg Oral Daily    therapeutic multivitamin-minerals  1 tablet Oral Daily    furosemide  20 mg Oral Daily    spironolactone  50 mg Oral Daily    piperacillin-tazobactam  3,375 mg IntraVENous Q8H    amLODIPine  10 mg Oral Daily    insulin lispro  0-12 Units SubCUTAneous TID WC    insulin lispro  0-6 Units SubCUTAneous Nightly    sodium chloride flush  10 mL IntraVENous 2 times per day    insulin glargine  0.25 Units/kg SubCUTAneous Nightly    lisinopril  20 mg Oral Daily    pantoprazole  40 mg IntraVENous Daily    lactulose  20 g Oral TID     PRN Meds: sodium chloride, sodium chloride, morphine, hydrOXYzine, sodium chloride, sodium chloride, glucose, glucagon (rDNA), dextrose, sodium chloride flush, sodium chloride, ondansetron, polyethylene glycol, acetaminophen **OR** acetaminophen, dextrose bolus (hypoglycemia) **OR** dextrose bolus (hypoglycemia), sodium chloride, sodium chloride      Intake/Output Summary (Last 24 hours) at 2/21/2022 1535  Last data filed at 2/21/2022 1127  Gross per 24 hour   Intake 1633.58 ml   Output 0 ml   Net 1633.58 ml       Physical Exam Performed:    /64   Pulse 76   Temp 98.3 °F (36.8 °C) (Oral)   Resp 18   Ht 5' 8\" (1.727 m)   Wt 232 lb (105.2 kg)   SpO2 91%   BMI 35.28 kg/m²     General appearance: No acute distress, appears stated age and cooperative. Sitting up on side of bed. HEENT: Pupils equal, round, and reactive to light. Conjunctivae/corneas clear. Neck: Supple, with full range of motion. No jugular venous distention. Trachea midline. Respiratory:  Normal respiratory effort. Clear to auscultation, bilaterally without Rales/Wheezes/Rhonchi. Breath sound diminished left chest.  Cardiovascular: Regular rate and rhythm with normal S1/S2 without murmurs, rubs or gallops. Abdomen: Distended and tender. Hypoactive bowel sounds. Musculoskeletal: Edema left arm. Good radial pulse. Sensation and cap refill normal.   Skin: See skin assessment photos. Extensive ecchymoses anterior chest L>R and abdomen. Ecchymoses dependent posterior buttock. No scrotal edema. Edema in left chest and arm. 4+ edema in left arm. Radial pulse palpated. Sensation intact left arm. Neurologic:  Neurovascularly intact without any focal sensory/motor deficits. Cranial nerves: II-XII intact, grossly non-focal.  Psychiatric: Alert and oriented, thought content appropriate, normal insight  Capillary Refill: Brisk,3 seconds, normal   Peripheral Pulses: +2 palpable, equal bilaterally       Labs:   Recent Labs     02/19/22  0559 02/19/22  0559 02/20/22  1200 02/20/22 2118 02/21/22  1214   WBC 17.5*  --  25.4*  --  18.5*   HGB 7.8*   < > 6.5* 7.6* 6.5*   HCT 22.1*   < > 19.0* 21.8* 18.5*   PLT 54*  --  51*  --  25*    < > = values in this interval not displayed. Recent Labs     02/19/22  0559 02/20/22  1200 02/21/22  0448    134* 132*   K 4.3 4.4 4.4    100 98*   CO2 22 17* 21   BUN 15 22* 34*   CREATININE <0.5* <0.5* <0.5*   CALCIUM 8.6 9.1 8.9     Recent Labs     02/19/22  0559 02/20/22  1200 02/21/22  0448   * 157* 142*   ALT 48* 45* 36   BILITOT 27.1* 33.4* 34.2*   ALKPHOS 129 104 81     Recent Labs     02/20/22  1200 02/20/22 2118 02/21/22  0448   INR 4.12* 3.68* 3.28*     Recent Labs     02/18/22  1920   TROPONINI <0.01       Urinalysis:      Lab Results   Component Value Date    NITRU Negative 02/18/2022    WBCUA 0-2 02/18/2022    RBCUA 0-2 02/18/2022    BLOODU TRACE-INTACT 02/18/2022    SPECGRAV 1.015 02/18/2022    GLUCOSEU >=1000 02/18/2022       Radiology:  CT CHEST ABDOMEN PELVIS W CONTRAST   Final Result   1. Prominent soft tissue hematomas along the left chest.  Correlate for   history of trauma and/or coagulopathy. 2. Moderate/large left pleural effusion with associated left lower lobe and   lingula atelectasis/partial collapse.    3. Scattered ground-glass opacities predominantly in the right upper lobe. Findings are nonspecific and may be related to edema, contusions, or foci of   inflammation/infection. 4. Fat containing left diaphragmatic hernia. 5. Cirrhotic morphology of the liver with associated portosystemic   collaterals and splenomegaly. 6. Small/moderate ascites. 7. Distended gallbladder with associated wall thickening and pericholecystic   fluid. Findings may be related to ascites, though possibility of   cholecystitis is not excluded in the appropriate clinical setting. 8. Diffuse soft tissue edema. 9. Mild bilateral gynecomastia. Assessment/Plan:    Active Hospital Problems    Diagnosis     Hematoma of left chest wall [G21.791C]     Alcoholic cirrhosis of liver (HCC) [K70.30]     Essential hypertension [I10]     Coagulopathy ([address] [D68.9]     Thrombocytopenia ([address] [D69.6]     Splenomegaly [R16.1]     New onset type 2 diabetes mellitus ([address] [E11.9]     Transaminitis [R74.01]     Hyperbilirubinemia [E80.6]     Elevated alkaline phosphatase level [R74.8]     Pleural effusion on left [J90]     Hypoalbuminemia [E88.09]     Ascites due to alcoholic cirrhosis (HCC) [Q86.60]     Hyperammonemia (HCC) [E72.20]     Alcohol abuse [F10.10]     Portal hypertension ([address] [K76.6]     Gynecomastia, male [N62]     Compression atelectasis [J98.11]     Elevated lipase [R74.8]     Class 2 obesity in adult [E66.9]     Suspected sleep apnea [R29.818]     Acute anemia [D64.9]      1. Anemia - Secondary to blood loss. Subcutaneous hematoma and possible GI bleed. Consulted GI. Correct coagulopathy. 2. Coagulopathy secondary to liver disease - Monitor INR. Vit K given. FFP given. Monitor INR Given FFP. Platelets low. Transfuse platelets. Consult hem/onc to assist with coagulopathy. 3. Alcohol related liver cirrhosis - Minimal ascites. GI consulted. Monitor ammonia level. MELD score 34.  4. Left pleural effusion - Pulmonary consulted.  Hold on thoracentesis. 5. Thrombocytopenia - Secondary to liver disease. Splenomegaly noted. Transfuse plts due to active bleed. 6. Left arm and chest hematoma - No compartment syndrome clinically. Check CK. Appreciate vascular surgery consult. Poor prognosis MELD score 34. Request palliative care consult. Called  transfer center. They do not have bed available. Per discussion in ED,  hepatology did not feel patient met criteria for transfer. Will call back if patient decompensates. Not a liver transplant candidate. DVT Prophylaxis: Coagulopathy  Diet: ADULT DIET; Regular; 5 carb choices (75 gm/meal); Low Sodium (2 gm)  ADULT ORAL NUTRITION SUPPLEMENT; Lunch, Dinner, Breakfast; Diabetic Oral Supplement  Code Status: Full Code    PT/OT Eval Status: Pending    Dispo - Acutely ill.     Isaias Land MD

## 2022-02-21 NOTE — PROGRESS NOTES
/63   Pulse 75   Temp 97.6 °F (36.4 °C) (Oral)   Resp 16   Ht 5' 8\" (1.727 m)   Wt 232 lb (105.2 kg)   SpO2 91%   BMI 35.28 kg/m²  on 2L O2 per nasal cannula. Pt resting quietly in bed, A&O. Pt with complaints of left shoulder/arm/flankl pain \"4\", was medicated by prior nurse with prn morphine, will give when due. Pt denies shortness of breath at this time. Lungs clear right side, diminished left side. NSR on tele. Pt denies any needs at this time. Bedside table, call light, fluids within reach. Bed alarm activated on bed. Pt instructed to call out for any needs and assistance. Will continue to monitor.   Emely Fontana RN  2/21/2022

## 2022-02-21 NOTE — PROGRESS NOTES
Ureña inserted, urine returned, 250mL, per pt's fiance request ureña left in place at this time. Will continue to monitor output.   Goldie López RN  2/21/2022

## 2022-02-21 NOTE — PROGRESS NOTES
Vascular F/u    Subpectoral chest wall/Asillary hematoma  INR 3.28, Plt 25. Unable to evacuate hematoma with current coagulopathy. Doubtful that correction, ir achievable, can be sustained. Agree with Palliative care consult. Continue conservative treatment- correct Plt and coags as able.

## 2022-02-22 VITALS
OXYGEN SATURATION: 90 % | BODY MASS INDEX: 33.87 KG/M2 | TEMPERATURE: 98.3 F | DIASTOLIC BLOOD PRESSURE: 54 MMHG | WEIGHT: 223.5 LBS | SYSTOLIC BLOOD PRESSURE: 111 MMHG | HEIGHT: 68 IN | HEART RATE: 63 BPM | RESPIRATION RATE: 20 BRPM

## 2022-02-22 LAB
A/G RATIO: 0.9 (ref 1.1–2.2)
ACANTHOCYTES: ABNORMAL
ALBUMIN SERPL-MCNC: 2.5 G/DL (ref 3.4–5)
ALP BLD-CCNC: 89 U/L (ref 40–129)
ALT SERPL-CCNC: 34 U/L (ref 10–40)
AMMONIA: 56 UMOL/L (ref 16–60)
ANION GAP SERPL CALCULATED.3IONS-SCNC: 11 MMOL/L (ref 3–16)
ANISOCYTOSIS: ABNORMAL
AST SERPL-CCNC: 142 U/L (ref 15–37)
BANDED NEUTROPHILS RELATIVE PERCENT: 26 % (ref 0–7)
BASOPHILS ABSOLUTE: 0 K/UL (ref 0–0.2)
BASOPHILS RELATIVE PERCENT: 0 %
BILIRUB SERPL-MCNC: 35.9 MG/DL (ref 0–1)
BUN BLDV-MCNC: 54 MG/DL (ref 7–20)
BURR CELLS: ABNORMAL
CALCIUM SERPL-MCNC: 8.7 MG/DL (ref 8.3–10.6)
CHLORIDE BLD-SCNC: 101 MMOL/L (ref 99–110)
CO2: 23 MMOL/L (ref 21–32)
CREAT SERPL-MCNC: <0.5 MG/DL (ref 0.9–1.3)
CRENATED RBC'S: ABNORMAL
DAT POLYSPECIFIC: NORMAL
EOSINOPHILS ABSOLUTE: 0.3 K/UL (ref 0–0.6)
EOSINOPHILS RELATIVE PERCENT: 2 %
GFR AFRICAN AMERICAN: >60
GFR NON-AFRICAN AMERICAN: >60
GLUCOSE BLD-MCNC: 134 MG/DL (ref 70–99)
GLUCOSE BLD-MCNC: 143 MG/DL (ref 70–99)
GLUCOSE BLD-MCNC: 150 MG/DL (ref 70–99)
GLUCOSE BLD-MCNC: 173 MG/DL (ref 70–99)
HAPTOGLOBIN: <10 MG/DL (ref 30–200)
HCT VFR BLD CALC: 21.7 % (ref 40.5–52.5)
HEMATOLOGY PATH CONSULT: NO
HEMOGLOBIN: 7.6 G/DL (ref 13.5–17.5)
HYPERCHROMASIA: ABNORMAL
HYPOCHROMIA: ABNORMAL
IMMATURE RETIC FRACT: 0.6 (ref 0.21–0.37)
INR BLD: 2.94 (ref 0.88–1.12)
LYMPHOCYTES ABSOLUTE: 1.4 K/UL (ref 1–5.1)
LYMPHOCYTES RELATIVE PERCENT: 8 %
MACROCYTES: ABNORMAL
MCH RBC QN AUTO: 34.2 PG (ref 26–34)
MCHC RBC AUTO-ENTMCNC: 35.1 G/DL (ref 31–36)
MCV RBC AUTO: 97.4 FL (ref 80–100)
MICROCYTES: ABNORMAL
MONOCYTES ABSOLUTE: 0.7 K/UL (ref 0–1.3)
MONOCYTES RELATIVE PERCENT: 4 %
NEUTROPHILS ABSOLUTE: 14.6 K/UL (ref 1.7–7.7)
NEUTROPHILS RELATIVE PERCENT: 60 %
NUCLEATED RED BLOOD CELLS: 2 /100 WBC
OVALOCYTES: ABNORMAL
PDW BLD-RTO: 26.2 % (ref 12.4–15.4)
PERFORMED ON: ABNORMAL
PLATELET # BLD: 44 K/UL (ref 135–450)
PLATELET SLIDE REVIEW: ABNORMAL
PMV BLD AUTO: 9.2 FL (ref 5–10.5)
POIKILOCYTES: ABNORMAL
POLYCHROMASIA: ABNORMAL
POTASSIUM REFLEX MAGNESIUM: 4.2 MMOL/L (ref 3.5–5.1)
PROTHROMBIN TIME: 34.8 SEC (ref 9.9–12.7)
RBC # BLD: 2.23 M/UL (ref 4.2–5.9)
RETICULOCYTE ABSOLUTE COUNT: 0.33 M/UL
RETICULOCYTE COUNT PCT: 14.97 % (ref 0.5–2.18)
SCHISTOCYTES: ABNORMAL
SLIDE REVIEW: ABNORMAL
SODIUM BLD-SCNC: 135 MMOL/L (ref 136–145)
TARGET CELLS: ABNORMAL
TOTAL PROTEIN: 5.2 G/DL (ref 6.4–8.2)
TOXIC GRANULATION: PRESENT
WBC # BLD: 17 K/UL (ref 4–11)

## 2022-02-22 PROCEDURE — 2580000003 HC RX 258: Performed by: INTERNAL MEDICINE

## 2022-02-22 PROCEDURE — 94761 N-INVAS EAR/PLS OXIMETRY MLT: CPT

## 2022-02-22 PROCEDURE — 83010 ASSAY OF HAPTOGLOBIN QUANT: CPT

## 2022-02-22 PROCEDURE — 6370000000 HC RX 637 (ALT 250 FOR IP): Performed by: INTERNAL MEDICINE

## 2022-02-22 PROCEDURE — 85025 COMPLETE CBC W/AUTO DIFF WBC: CPT

## 2022-02-22 PROCEDURE — 36415 COLL VENOUS BLD VENIPUNCTURE: CPT

## 2022-02-22 PROCEDURE — 82140 ASSAY OF AMMONIA: CPT

## 2022-02-22 PROCEDURE — C9113 INJ PANTOPRAZOLE SODIUM, VIA: HCPCS | Performed by: INTERNAL MEDICINE

## 2022-02-22 PROCEDURE — 2700000000 HC OXYGEN THERAPY PER DAY

## 2022-02-22 PROCEDURE — 6360000002 HC RX W HCPCS: Performed by: INTERNAL MEDICINE

## 2022-02-22 PROCEDURE — 85045 AUTOMATED RETICULOCYTE COUNT: CPT

## 2022-02-22 PROCEDURE — 85610 PROTHROMBIN TIME: CPT

## 2022-02-22 PROCEDURE — 86880 COOMBS TEST DIRECT: CPT

## 2022-02-22 PROCEDURE — 80053 COMPREHEN METABOLIC PANEL: CPT

## 2022-02-22 RX ORDER — SPIRONOLACTONE 50 MG/1
50 TABLET, FILM COATED ORAL DAILY
Qty: 30 TABLET | Refills: 3 | DISCHARGE
Start: 2022-02-23

## 2022-02-22 RX ORDER — PANTOPRAZOLE SODIUM 40 MG/10ML
40 INJECTION, POWDER, LYOPHILIZED, FOR SOLUTION INTRAVENOUS DAILY
DISCHARGE
Start: 2022-02-23

## 2022-02-22 RX ORDER — MORPHINE SULFATE 4 MG/ML
6 INJECTION, SOLUTION INTRAMUSCULAR; INTRAVENOUS
Refills: 0 | Status: SHIPPED | OUTPATIENT
Start: 2022-02-22 | End: 2022-02-25

## 2022-02-22 RX ORDER — INSULIN GLARGINE 100 [IU]/ML
15 INJECTION, SOLUTION SUBCUTANEOUS NIGHTLY
Qty: 10 ML | Refills: 3 | DISCHARGE
Start: 2022-02-22

## 2022-02-22 RX ORDER — MORPHINE SULFATE 4 MG/ML
6 INJECTION, SOLUTION INTRAMUSCULAR; INTRAVENOUS
Status: DISCONTINUED | OUTPATIENT
Start: 2022-02-22 | End: 2022-02-22 | Stop reason: HOSPADM

## 2022-02-22 RX ORDER — FUROSEMIDE 20 MG/1
20 TABLET ORAL DAILY
Qty: 60 TABLET | Refills: 3 | DISCHARGE
Start: 2022-02-23

## 2022-02-22 RX ORDER — PHYTONADIONE 5 MG/1
5 TABLET ORAL DAILY
Status: DISCONTINUED | OUTPATIENT
Start: 2022-02-22 | End: 2022-02-22 | Stop reason: HOSPADM

## 2022-02-22 RX ORDER — LACTULOSE 10 G/15ML
20 SOLUTION ORAL 3 TIMES DAILY
Refills: 1 | DISCHARGE
Start: 2022-02-22

## 2022-02-22 RX ORDER — PHYTONADIONE 5 MG/1
5 TABLET ORAL DAILY
Qty: 1 TABLET | Refills: 3 | DISCHARGE
Start: 2022-02-23 | End: 2022-02-28

## 2022-02-22 RX ORDER — POLYETHYLENE GLYCOL 3350 17 G/17G
17 POWDER, FOR SOLUTION ORAL DAILY PRN
Qty: 527 G | Refills: 1 | DISCHARGE
Start: 2022-02-22 | End: 2022-03-24

## 2022-02-22 RX ADMIN — INSULIN LISPRO 2 UNITS: 100 INJECTION, SOLUTION INTRAVENOUS; SUBCUTANEOUS at 11:36

## 2022-02-22 RX ADMIN — SODIUM CHLORIDE, PRESERVATIVE FREE 10 ML: 5 INJECTION INTRAVENOUS at 01:48

## 2022-02-22 RX ADMIN — SPIRONOLACTONE 50 MG: 25 TABLET ORAL at 07:40

## 2022-02-22 RX ADMIN — INSULIN LISPRO 2 UNITS: 100 INJECTION, SOLUTION INTRAVENOUS; SUBCUTANEOUS at 08:43

## 2022-02-22 RX ADMIN — PIPERACILLIN AND TAZOBACTAM 3375 MG: 3; .375 INJECTION, POWDER, LYOPHILIZED, FOR SOLUTION INTRAVENOUS at 05:11

## 2022-02-22 RX ADMIN — FUROSEMIDE 20 MG: 20 TABLET ORAL at 07:40

## 2022-02-22 RX ADMIN — FLUOXETINE 40 MG: 20 CAPSULE ORAL at 07:40

## 2022-02-22 RX ADMIN — MORPHINE SULFATE 6 MG: 4 INJECTION INTRAVENOUS at 13:03

## 2022-02-22 RX ADMIN — MORPHINE SULFATE 4 MG: 4 INJECTION INTRAVENOUS at 09:41

## 2022-02-22 RX ADMIN — MULTIPLE VITAMINS W/ MINERALS TAB 1 TABLET: TAB at 07:40

## 2022-02-22 RX ADMIN — LACTULOSE 20 G: 20 SOLUTION ORAL at 07:40

## 2022-02-22 RX ADMIN — METOPROLOL TARTRATE 25 MG: 25 TABLET, FILM COATED ORAL at 07:40

## 2022-02-22 RX ADMIN — MORPHINE SULFATE 4 MG: 4 INJECTION INTRAVENOUS at 01:48

## 2022-02-22 RX ADMIN — PIPERACILLIN AND TAZOBACTAM 3375 MG: 3; .375 INJECTION, POWDER, LYOPHILIZED, FOR SOLUTION INTRAVENOUS at 13:08

## 2022-02-22 RX ADMIN — PANTOPRAZOLE SODIUM 40 MG: 40 INJECTION, POWDER, FOR SOLUTION INTRAVENOUS at 07:40

## 2022-02-22 RX ADMIN — SODIUM CHLORIDE, PRESERVATIVE FREE 10 ML: 5 INJECTION INTRAVENOUS at 05:09

## 2022-02-22 RX ADMIN — LACTULOSE 20 G: 20 SOLUTION ORAL at 13:03

## 2022-02-22 RX ADMIN — SODIUM CHLORIDE, PRESERVATIVE FREE 10 ML: 5 INJECTION INTRAVENOUS at 07:40

## 2022-02-22 RX ADMIN — MORPHINE SULFATE 6 MG: 4 INJECTION INTRAVENOUS at 18:13

## 2022-02-22 ASSESSMENT — PAIN SCALES - GENERAL
PAINLEVEL_OUTOF10: 7
PAINLEVEL_OUTOF10: 0
PAINLEVEL_OUTOF10: 7
PAINLEVEL_OUTOF10: 10
PAINLEVEL_OUTOF10: 10
PAINLEVEL_OUTOF10: 0

## 2022-02-22 ASSESSMENT — PAIN DESCRIPTION - ORIENTATION: ORIENTATION: LEFT

## 2022-02-22 ASSESSMENT — PAIN DESCRIPTION - FREQUENCY: FREQUENCY: INTERMITTENT

## 2022-02-22 ASSESSMENT — PAIN DESCRIPTION - PAIN TYPE: TYPE: ACUTE PAIN

## 2022-02-22 ASSESSMENT — PAIN DESCRIPTION - LOCATION: LOCATION: SHOULDER

## 2022-02-22 ASSESSMENT — PAIN DESCRIPTION - DIRECTION: RADIATING_TOWARDS: LEFT FLANK

## 2022-02-22 ASSESSMENT — PAIN DESCRIPTION - DESCRIPTORS: DESCRIPTORS: ACHING

## 2022-02-22 ASSESSMENT — PAIN - FUNCTIONAL ASSESSMENT: PAIN_FUNCTIONAL_ASSESSMENT: PREVENTS OR INTERFERES SOME ACTIVE ACTIVITIES AND ADLS

## 2022-02-22 ASSESSMENT — PAIN DESCRIPTION - ONSET: ONSET: ON-GOING
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
## 2022-02-22 NOTE — CONSULTS
Palliative Care Initial Note  Palliative Care Admit date:  2/22/22  Reason for c/s: GOC, Code status    Advance Directives:  Pt has executed his HCPOA and designated Nadiya Mendoza as his healthcare proxy. Pt has a full code status. Met @ BS w/ pt and [place]. Our conversation naturally transitioned to talking about pts wishes re: resuscitation. Oscar Ireland, w/o hesitation, expressed that he would not wish to be intubated and on MV support even for the short term, nor would he wish to receive cpr. Neetadayanara was supportive of pts decision. Will f/u w/ provider    Plan of care/goals:  Initially, Oscar Ireland expressed his wish to remain as aggressive as physician's feel is appropriate. Neetadayanara reminded Oscar Ireland of his BS conversation yesterday \"with Dr. Suni Pham" that \"surgery is too risky, you won't have surgery. \"   Oscar Ireland was appropriately emotional as he digests news and contemplated limited life expectancy. He and [place] did affirm to writer their desire to focus care around emotional and sx support. However, we weren't able to fully discuss option for hospice as pt needed bedpan. ADDENDUM:  Able to return for more d/w pt but, primarily, [place]. She affirms and supports shifting the focus of pts care to comfort and emotional support. We have reviewed area hospice providers and likelihood pt would qualify for the IPU in light of his pain and significant hematoma/coagulaopathy. Noted MELD score 34    Social/Spiritual:  Oscar Ireland has five (5) children, three of whom are adults though none reside locally. Pt was clear that his adult children aren't in an emotionally appropriate place for healthcare decision making. Pt and Haven live together in a [address] [place] [address], had planned on marrying. Plan:  Ref called to [facility] as pt/family prefer eval for the [facility].   Amended code status to reflect DNR/DNI      Symptom Assessment:         Symptoms       Present Y/N          Medications    Doses in last          24 hours    Pain Having ongoing, intermittent c/o pain rated 10/0-10 @ worst.  Pain is described as aching & in the area of Lt flank, Lt arm/shoulder. Pt complains that relief from current analgesia is incomplete and short lived. Morphine 4mg IV Q4 PRN    Increased order obtained               5    N/V  Ondansetron 4mg IV Q4 PRN               0    Date of last BM          2/22 - X3 Chronulac 20g TID        Scheduled     Anxiety/agitation        Depression  Prozac 40mg daily        Scheduled     SOB       Appetite Endorsed feeling hungry, eats very little  Alb 2.5, Tot prot 5.2      Sleep       Performance Status           Palliative Performance Scale:   [] 60%  Amb reduced; Sig dz. Can't do hobbies/housework; Intake normal or reduced, Occasional assist; LOC full/confusion   [] 50%  Mainly sit/lie; Extensive disease. Mainly assist, Intake normal or reduced; Occasional assist; LOC full/confusion   [] 40%  Mainly in bed; Extensive disease; Mainly assist; Intake normal or reduced; Occasional assist; LOC full/confusion   [] 30%  Bed bound, Extensive disease; Total care; Intake reduced; LOC full/confusion   [x] 20%  Bed bound; Extensive disease; Total care; Intake minimal; Drowsy   [] 10%  Bed bound; Extensive disease;  Total care; Mouth care only; Drowsycoma   []  0%   Death         Reason for consult:  _X_ Advance Care Planning  ___ Transition of Care Planning  _X_ Psychosocial/Spiritual Support  ___ Symptom Management                                                                                                                                                                                        Alethea Lubin RN

## 2022-02-22 NOTE — CARE COORDINATION
Patient has acute anemia, alcoholic cirrhosis of liver, coagulopathy, hx of alcohol abuse, New onset type 2 diabetes, hematoma left chest wall and multiple other comorbidities. Palliative care involved for goals of care and per discussion with Sierra Bejarano patient wants to be a DNR and move towards comfort care. She plans to reach out to Dr. Jenni Mendieta to discuss this with him. Was IPTA and was living with fiancee prior to admit. Will follow for needs and monitor for outcome of palliative consult. May need hospice consult per Sierra Bejarano RN palliative nurse.

## 2022-02-22 NOTE — PROGRESS NOTES
Progress Note    Patient Shar Doyle  MRN: [id number]  YOB: 1975 Age: 49 y.o. Sex: male  Room: [address] [place] [address]       Admitting Physician: Mayra Guerin MD   Date of Admission: 2/18/2022  6:20 PM   Primary Care Physician: Freddie Martin     Subjective:  Shar Doyle was seen and examined. We are following for EtOH cirrhosis/hepatitis, liver failure. -- No acute events overnight  -- Continues to have significant pain  -- Patient is more confused today  -- Fiance at bedside this morning    ROS:  Constitutional: Denies fever, no change in appetite  Respiratory: Denies cough or shortness of breath  Cardiovascular: Denies chest pain or edema    Objective:  Vital Signs:   Vitals:    02/22/22 1300   BP: (!) 111/54   Pulse:    Resp:    Temp:    SpO2:          Physical Exam:  Constitutional: Alert and oriented x 4. No acute distress. Respiratory: Respirations nonlabored, no crepitus. Large left chest hematoma extending below his umbilicus and flank also extending to his right chest   GI: Abdomen nondistended, soft, and nontender. Neurological: No focal deficits noted. Trivial asterixis.     Intake/Output:    Intake/Output Summary (Last 24 hours) at 2/22/2022 1540  Last data filed at 2/22/2022 1423  Gross per 24 hour   Intake 1160 ml   Output 2000 ml   Net -840 ml        Current Medications:  Current Facility-Administered Medications   Medication Dose Route Frequency Provider Last Rate Last Admin    phytonadione (VITAMIN K) tablet 5 mg  5 mg Oral Daily Odalis Lawson MD        morphine (PF) injection 6 mg  6 mg IntraVENous Q2H PRN Anthony Cunningham MD   6 mg at 02/22/22 1303    0.9 % sodium chloride infusion   IntraVENous PRN Anthony Cunningham MD        0.9 % sodium chloride infusion   IntraVENous PRN Anthony Cunningham MD        hydrOXYzine (VISTARIL) capsule 50 mg  50 mg Oral TID PRN Anthony Cunningham MD   50 mg at 02/21/22 2706    prazosin (MINIPRESS) capsule 2 mg  2 mg Oral Nightly Sky Quinn MD   2 mg at 02/20/22 2129    metoprolol tartrate (LOPRESSOR) tablet 25 mg  25 mg Oral BID Sky Quinn MD   25 mg at 02/22/22 0740    ARIPiprazole (ABILIFY) tablet 15 mg  15 mg Oral Nightly Sky Quinn MD   15 mg at 02/21/22 2224    FLUoxetine (PROZAC) capsule 40 mg  40 mg Oral Daily Sky Quinn MD   40 mg at 02/22/22 0740    therapeutic multivitamin-minerals 1 tablet  1 tablet Oral Daily Sky Quinn MD   1 tablet at 02/22/22 0740    furosemide (LASIX) tablet 20 mg  20 mg Oral Daily Debra Brown MD   20 mg at 02/22/22 0740    spironolactone (ALDACTONE) tablet 50 mg  50 mg Oral Daily Debra Brown MD   50 mg at 02/22/22 0740    0.9 % sodium chloride infusion   IntraVENous PRN Sky Quinn MD        piperacillin-tazobactam (ZOSYN) 3,375 mg in dextrose 5 % 100 mL IVPB extended infusion (mini-bag)  3,375 mg IntraVENous Q8H Sky Quinn MD 25 mL/hr at 02/22/22 1308 3,375 mg at 02/22/22 1308    0.9 % sodium chloride infusion   IntraVENous PRN Josh Le MD        amLODIPine (NORVASC) tablet 10 mg  10 mg Oral Daily Ian Prakash MD        insulin lispro (HUMALOG) injection vial 0-12 Units  0-12 Units SubCUTAneous TID WC Ian Prakash MD   2 Units at 02/22/22 1136    insulin lispro (HUMALOG) injection vial 0-6 Units  0-6 Units SubCUTAneous Nightly Ian Prakash MD   1 Units at 02/21/22 2310    glucose (GLUTOSE) 40 % oral gel 15 g  15 g Oral PRN Ian Prakash MD        glucagon (rDNA) injection 1 mg  1 mg IntraMUSCular PRN Ian Prakash MD        dextrose 5 % solution  100 mL/hr IntraVENous PRN Ian Prakash MD        sodium chloride flush 0.9 % injection 10 mL  10 mL IntraVENous 2 times per day Ian Prkaash MD   10 mL at 02/22/22 0740    sodium chloride flush 0.9 % injection 10 mL  10 mL IntraVENous PRN Ian Prakash MD   10 mL at 02/22/22 0503    0.9 % sodium chloride infusion  25 mL IntraVENous PRN Su Tsai  mL/hr at 02/22/22 0511 Restarted at 02/22/22 0511    ondansetron [facility]) injection 4 mg  4 mg IntraVENous Q4H PRN Su Tsai MD   4 mg at 02/20/22 1720    polyethylene glycol (GLYCOLAX) packet 17 g  17 g Oral Daily PRN Su Tsai MD        acetaminophen (TYLENOL) tablet 650 mg  650 mg Oral Q4H PRN Su Tsai MD   650 mg at 02/20/22 2129    Or    acetaminophen (TYLENOL) suppository 650 mg  650 mg Rectal Q4H PRN Su Tsai MD        insulin glargine (LANTUS) injection vial 26 Units  0.25 Units/kg SubCUTAneous Nightly Su Tsai MD   26 Units at 02/21/22 2310    lisinopril (PRINIVIL;ZESTRIL) tablet 20 mg  20 mg Oral Daily Su Tsai MD        dextrose bolus (hypoglycemia) 10% 125 mL  125 mL IntraVENous PRN Su Tsai MD        Or    dextrose bolus (hypoglycemia) 10% 250 mL  250 mL IntraVENous PRN Su Tsai MD        pantoprazole (PROTONIX) injection 40 mg  40 mg IntraVENous Daily Tamara Collado MD   40 mg at 02/22/22 0740    lactulose (CHRONULAC) 10 GM/15ML solution 20 g  20 g Oral TID Adan Lyman MD   20 g at 02/22/22 1303    0.9 % sodium chloride infusion   IntraVENous RASHMI Tsai MD        0.9 % sodium chloride infusion   IntraVENous PRROSELIA Tsai MD             Recent labs and imaging reviewed. Assessment:    57-year-old male with history ETOH cirrhosis with recent hospitalization at [facility] for acute alcoholic hepatitis. Recently saw me in clinic as hospital follow-up to establish care. He was treated with prednisolone for acute alcoholic hepatitis. Follow-up labs after our visit demonstrated worsening synthetic function with an INR greater than 5 and a rising meld score. Severe synthetic decompensation.   He was directed to be admitted due to worsening labs as well as progressive shortness of breath which is likely multifactorial but does note a large pleural effusion and volume overload. He also had an extensive hematoma on his left chest with significant swelling of the entire upper extremity. He has unfortunately not responded to prednisolone based on Lille score (0.776). He is not a transplant candidate due to continued alcohol abuse. There does appear to be a familial history of significant liver disease especially at young ages however due to his alcohol abuse he is not a candidate. He was drinking heavy amounts since his incarceration 2 years ago. There is initially report of hepatitis C causing his cirrhosis in addition although his most recent antibody was negative overall very poor prognosis which was discussed with the patient and his girlfriend on 2 different occasions with Dr. Kim Medrano. CT with no acute findings, noted ascites, large pleural effusion, gallbladder edema from liver disease, and signs of portal hypertension. Infectious work up has been unremarkable thus far. Vascular surgery consulted given spontaneous hematoma that continues to worsen and patient is symptomatic. Complains of pain and has significant swelling of the LUE. Patient and family have met with palliative care this morning and a hospice consult has been placed. Patient and family agreed that they want to focus on sx management at this time. Davy was grateful for the care patient has received. Plan:  1. Continue lactulose 20 mg three times daily--titrate for 3-4 BMs per day  2. Continue low dose diuretics  3. High protein, low sodium diet  4. Follow up hospice consult   5. Patient is not a transplant candidate given recent EtOH use  6. Supportive care       Shila Mojica, [facility].  Also available via [employer]

## 2022-02-22 NOTE — CONSULTS
20 mg at 02/22/22 0740    spironolactone (ALDACTONE) tablet 50 mg  50 mg Oral Daily Leonora Ma MD   50 mg at 02/22/22 0740    0.9 % sodium chloride infusion   IntraVENous PRN Darnell Ramirez MD        piperacillin-tazobactam (ZOSYN) 3,375 mg in dextrose 5 % 100 mL IVPB extended infusion (mini-bag)  3,375 mg IntraVENous Q8H Darnell Ramirez MD   Stopped at 02/22/22 0741    0.9 % sodium chloride infusion   IntraVENous PRN Yodit Alvarado MD        amLODIPine (NORVASC) tablet 10 mg  10 mg Oral Daily Elena Rankin MD        insulin lispro (HUMALOG) injection vial 0-12 Units  0-12 Units SubCUTAneous TID WC Elena Rankin MD   4 Units at 02/21/22 1338    insulin lispro (HUMALOG) injection vial 0-6 Units  0-6 Units SubCUTAneous Nightly Elena Rankin MD   1 Units at 02/21/22 2310    glucose (GLUTOSE) 40 % oral gel 15 g  15 g Oral PRN Elena Rankin MD        glucagon (rDNA) injection 1 mg  1 mg IntraMUSCular PRN Elena Rankin MD        dextrose 5 % solution  100 mL/hr IntraVENous PRN Elena Rankin MD        sodium chloride flush 0.9 % injection 10 mL  10 mL IntraVENous 2 times per day Elena Rankin MD   10 mL at 02/22/22 0740    sodium chloride flush 0.9 % injection 10 mL  10 mL IntraVENous PRN Elena Rankin MD   10 mL at 02/22/22 0509    0.9 % sodium chloride infusion  25 mL IntraVENous PRN Elena Rankin  mL/hr at 02/22/22 0511 Restarted at 02/22/22 0511    ondansetron (ZOFRAN) injection 4 mg  4 mg IntraVENous Q4H PRN Elena Rankin MD   4 mg at 02/20/22 1720    polyethylene glycol (GLYCOLAX) packet 17 g  17 g Oral Daily PRN Elena Rankin MD        acetaminophen (TYLENOL) tablet 650 mg  650 mg Oral Q4H PRN Elena Rankin MD   650 mg at 02/20/22 2129    Or    acetaminophen (TYLENOL) suppository 650 mg  650 mg Rectal Q4H PRN Elena Rankin MD        insulin glargine (LANTUS) injection vial 26 Units  0.25 Units/kg SubCUTAneous Nightly Elena Rankin, MD   26 Units at 02/21/22 2310    lisinopril (PRINIVIL;ZESTRIL) tablet 20 mg  20 mg Oral Daily Naman Nolan MD        dextrose bolus (hypoglycemia) 10% 125 mL  125 mL IntraVENous PRN Naman Nolan MD        Or    dextrose bolus (hypoglycemia) 10% 250 mL  250 mL IntraVENous PRN Naman Nolan MD        pantoprazole (PROTONIX) injection 40 mg  40 mg IntraVENous Daily Leah Gomez MD   40 mg at 02/22/22 0740    lactulose (CHRONULAC) 10 GM/15ML solution 20 g  20 g Oral TID Sandra Briscoe MD   20 g at 02/22/22 0740    0.9 % sodium chloride infusion   IntraVENous PRN Naman Nolan MD        0.9 % sodium chloride infusion   IntraVENous PRN Naman Nolan MD         Allergies:  No Known Allergies    Social History:   Social History     Socioeconomic History    Marital status: Single     Spouse name: Not on file    Number of children: Not on file    Years of education: Not on file    Highest education level: Not on file   Occupational History    Not on file   Tobacco Use    Smoking status: Former Smoker    Smokeless tobacco: Never Used   Vaping Use    Vaping Use: Not on file   Substance and Sexual Activity    Alcohol use: Yes     Comment: Last drink was 02/13/2022 - pt has positive history of alcohol abuse within the last 10 years due to father passing + depression    Drug use: Not Currently     Types: Marijuana Cassius Budge)    Sexual activity: Yes     Partners: Female   Other Topics Concern    Not on file   Social History Narrative    Not on file     Social Determinants of Health     Financial Resource Strain:     Difficulty of Paying Living Expenses: Not on file   Food Insecurity:     Worried About [address] in the Last Year: Not on file    [address] in the Last Year: Not on file   Transportation Needs:     Lack of Transportation (Medical): Not on file    Lack of Transportation (Non-Medical):  Not on file   Physical Activity:     Days of Exercise per Week: Not on file    Minutes of Exercise per Session: Not on file   Stress:     Feeling of Stress : Not on file   Social Connections:     Frequency of Communication with Friends and Family: Not on file    Frequency of Social Gatherings with Friends and Family: Not on file    Attends Sikhism Services: Not on file    Active Member of Clubs or Organizations: Not on file    Attends Club or Organization Meetings: Not on file    Marital Status: Not on file   Intimate Partner Violence:     Fear of Current or Ex-Partner: Not on file    Emotionally Abused: Not on file    Physically Abused: Not on file    Sexually Abused: Not on file   Housing Stability:     Unable to Pay for Housing in the Last Year: Not on file    Number of [place] in the Last Year: Not on file    Unstable Housing in the Last Year: Not on file          Family History:     History reviewed. No pertinent family history. REVIEW OF SYSTEMS:      Constitutional:  No weight loss, No fever, No chills, No night sweats. Energy level good.   Eyes:  No impairment or change in vision  ENT / Mouth:  No pain, abnormal ulceration, bleeding, nasal drip or change in voice or hearing  Cardiovascular:  No chest pain, palpitations, new edema, or calf discomfort  Respiratory:  No pain, hemoptysis, change to breathing  Breast:  No pain, discharge, change in appearance or texture  Gastrointestinal:  No pain, cramping, jaundice, change to eating and bowel habits  Urinary:  No pain, bleeding or change in continence  Genitalia: No pain, bleeding or discharge  Musculoskeletal:  No redness, pain, edema or weakness  Skin:  No pruritus, rash, change to nodules or lesions  Neurologic:  No discomfort, change in mental status, speech, sensory or motor activity  Psychiatric:  No change in concentration or change to affect or mood  Endocrine:  No hot flashes, increased thirst, or change to urine production  Hematologic: Significant bleeding in the upper left chest.  Lymphatic:  No lymphadenopathy or lymphedema  Allergy / Immunologic:  No eczema, hives, frequent or recurrent infections    PHYSICAL EXAM:      Vitals:  BP (!) 106/56   Pulse 67   Temp 98.2 °F (36.8 °C) (Oral)   Resp 20   Ht 5' 8\" (1.727 m)   Wt 223 lb 8 oz (101.4 kg)   SpO2 90%   BMI 33.98 kg/m²   CONSTITUTIONAL: awake, alert, cooperative, no apparent distress   EYES: pupils equal, round and reactive to light, sclera clear and conjunctiva normal  ENT: Normocephalic, without obvious abnormality, atraumatic  NECK: supple, symmetrical, no jugular venous distension and no carotid bruits   HEMATOLOGIC/LYMPHATIC: There is a large soft tissue mass around the left pectoral muscle. It is not overly tender to touch. LUNGS: no increased work of breathing and clear to auscultation   CARDIOVASCULAR: regular rate and rhythm, normal S1 and S2, no murmur noted  ABDOMEN: normal bowel sounds x 4, soft, non-distended, non-tender, no masses palpated, no hepatosplenomgaly   MUSCULOSKELETAL: full range of motion noted, tone is normal  NEUROLOGIC: awake, alert, oriented to name, place and time. Motor skills grossly intact. SKIN: Normal skin color, texture, turgor and no jaundice. appears intact   EXTREMITIES: no LE edema       DATA:    PT/INR:    Recent Labs     02/20/22  1200 02/20/22  1200 02/20/22  2118 02/21/22  0448 02/22/22  0440   PROT 6.0*  --   --  5.1* 5.2*   INR 4.12*   < > 3.68* 3.28* 2.94*    < > = values in this interval not displayed. PTT:  No results for input(s): APTT in the last 72 hours.   CMP:    Lab Results   Component Value Date     02/22/2022    K 4.2 02/22/2022     02/22/2022    CO2 23 02/22/2022    BUN 54 02/22/2022    PROT 5.2 02/22/2022     :    Lab Results   Component Value Date    MG 1.90 02/18/2022     Phosphorus:  No components found for: PO4  Calcium:  No results found for: CA  CBC:    Lab Results   Component Value Date    WBC 17.0 02/22/2022    RBC 2.23 02/22/2022    HGB 7.6 02/22/2022    HCT 21.7 02/22/2022    MCV 97.4 02/22/2022    RDW 26.2 02/22/2022    PLT 44 02/22/2022     DIFF:  Lab Results   Component Value Date    MCV 97.4 02/22/2022    RDW 26.2 02/22/2022      [email or web address]  Uric Acid:  @[email or web address](URIC)@    Radiology Review:  2 large hematomas in the left chest.  Splenomegaly and cirrhotic morphology of the liver. Problem List  Patient Active Problem List   Diagnosis    Acute anemia    Alcoholic cirrhosis of liver ([address]    Essential hypertension    Coagulopathy (HCC)    Thrombocytopenia (HCC)    Splenomegaly    New onset type 2 diabetes mellitus ([address]    Transaminitis    Hyperbilirubinemia    Elevated alkaline phosphatase level    Pleural effusion on left    Hypoalbuminemia    Ascites due to alcoholic cirrhosis (HCC)    Hyperammonemia (HCC)    Alcohol abuse    Portal hypertension ([address]    Gynecomastia, male    Compression atelectasis    Elevated lipase    Class 2 obesity in adult    Suspected sleep apnea    Hematoma of left chest wall       IMPRESSION/RECOMMENDATIONS:    Coagulopathy:  -This is due to liver disease  -He can be given vitamin K, but I do not think it would be overly effective  -If he needs a procedure, it will be very difficult to reverse his coagulopathy. He would need FFP which would be short-lived and Doptelet to briefly increase plaetelets  -He would still be at high risk for procedure  -I agree with vascular surgery that his coagulation parameters cannot be fully corrected    Alcoholic liver disease:  -His child Mcwilliams score is 10  -His chronic liver disease classification is a child's Mcwilliams C  -Estimated 1 year mortality is 55% and 2-year mortality at 65%.   -I told him that he needs to quit drinking with and he was already aware  -Agree with GI that he is not a transplant candidate due to persistent drinking  -I did explain all of this to the patient    Thrombocytopenia:  -Related to liver disease  -See above        Thank you for asking me to see the patient.        Don Lovelace MD  Please Contact Through [employer]

## 2022-02-22 NOTE — DISCHARGE SUMMARY
Hospital Medicine Discharge Summary    Patient ID: Hershal Halsted      Patient's PCP: Indy Post    Admit Date: 2/18/2022     Discharge Date:   2/22/2022    Admitting Provider: Ricci Bryant MD     Discharge Provider: Tyler Almaguer MD     Discharge Diagnoses: Active Hospital Problems    Diagnosis     Hematoma of left chest wall [T32.103P]     Alcoholic cirrhosis of liver (HCC) [K70.30]     Essential hypertension [I10]     Coagulopathy (HCC) [D68.9]     Thrombocytopenia ([address] [D69.6]     Splenomegaly [R16.1]     New onset type 2 diabetes mellitus ([address] [E11.9]     Transaminitis [R74.01]     Hyperbilirubinemia [E80.6]     Elevated alkaline phosphatase level [R74.8]     Pleural effusion on left [J90]     Hypoalbuminemia [E88.09]     Ascites due to alcoholic cirrhosis (HCC) [J85.20]     Hyperammonemia (HCC) [E72.20]     Alcohol abuse [F10.10]     Portal hypertension ([address] [K76.6]     Gynecomastia, male [N62]     Compression atelectasis [J98.11]     Elevated lipase [R74.8]     Class 2 obesity in adult [E66.9]     Suspected sleep apnea [R29.818]     Acute anemia [D64.9]        The patient was seen and examined on day of discharge and this discharge summary is in conjunction with any daily progress note from day of discharge.     Hospital Course:     Pt is an 49y.o. year-old male with a history of hypertension and alcoholic cirrhosis of the liver. Edwin Bell presents to the emergency room at the request of his primary care physician for evaluation of abnormal labs. Edwin Bell does not know which labs were abnormal.  He also complains of generalized abdominal pain and left-sided pain.  He does have an extensive area of ecchymosis involving his entire left side of his trunk with swelling extending into his left arm.  Upon evaluation he was found to have a hemoglobin of 6.9, a platelet count of 47, a large pleural effusion on the left side, abnormal liver function tests and a glucose level of Quadra 106 has no prior history of diabetes mellitus. [facility] is being admitted for further evaluation and treatment. Associated signs and symptoms do not include fever or chills, nausea, vomiting, hemoptysis, hematochezia, diarrhea, constipation or urinary symptoms.     Patient diagnosed with EtOH related liver disease in 2021. 2/1/2022 Tbili = 14.5. Patient was admitted on 2/1 for hematoma in chest and coagulopathy. Patient with progressive ecchymoses and pain in left chest over past week. No recent trauma. No melena or BRBPR. No nausea. Patient denies seizure. He was seeing Dr. Maciel Becerra as outpatient.      2/20 FFP ordered  2/21 Platelet transfusion ordered. Palliative care consult. Hem/onc consult. 2/22 Seen by palliative care. Hospice consult requested. Patient with worsening hematoma in left chest and arm. He had progressive decrease in hgb. He required transfusion of prbc's. Patient also given vit k for coagulopathy and FFP and platelets. Minimal correction of coagulopathy. Hem/onc consulted but not much could be done for coagulopathy. Patient was seen by vascular surgery but he is a poor surgical candidate due to coagulopathy. He had some encephalopathy and was started on lactulose. GI was following for possible GI bleed. Prognosis was poor. Patient not a candidate for liver transplant.  was called and he was not a candidate for liver transplant. Palliative care was consulted. After discussion with patient and significant other, his code status was changed and hospice referral was made. His MELD score was 35 on admission. Poor prognosis. Physical Exam Performed:     BP (!) 111/54   Pulse 63   Temp 98.3 °F (36.8 °C) (Oral)   Resp 20   Ht 5' 8\" (1.727 m)   Wt 223 lb 8 oz (101.4 kg)   SpO2 90%   BMI 33.98 kg/m²       General appearance: Moderate distress, appears stated age and cooperative. Laying in bed. HEENT: Pupils equal, round, and reactive to light.  Conjunctivae/corneas clear.  Neck: Supple, with full range of motion. No jugular venous distention. Trachea midline. Respiratory:  Normal respiratory effort. Clear to auscultation, bilaterally without Rales/Wheezes/Rhonchi. Breath sound diminished left chest.  Cardiovascular: Regular rate and rhythm with normal S1/S2 without murmurs, rubs or gallops. Abdomen: Distended and tender. Hypoactive bowel sounds. Musculoskeletal: Edema left arm. Good radial pulse. Sensation and cap refill normal.   Skin: See skin assessment photos. Extensive ecchymoses anterior chest L>R and abdomen. Ecchymoses dependent posterior buttock. No scrotal edema. Edema in left chest and arm. 4+ edema in left arm. Increased in size. Radial pulse palpated. Sensation intact left arm. Neurologic:  Neurovascularly intact without any focal sensory/motor deficits. Cranial nerves: II-XII intact, grossly non-focal.  Psychiatric: Alert and oriented, thought content appropriate, normal insight  Capillary Refill: Brisk,3 seconds, normal   Peripheral Pulses: +2 palpable, equal bilaterally          Labs: For convenience and continuity at follow-up the following most recent labs are provided:      CBC:    Lab Results   Component Value Date    WBC 17.0 02/22/2022    HGB 7.6 02/22/2022    HCT 21.7 02/22/2022    PLT 44 02/22/2022       Renal:    Lab Results   Component Value Date     02/22/2022    K 4.2 02/22/2022     02/22/2022    CO2 23 02/22/2022    BUN 54 02/22/2022    CREATININE <0.5 02/22/2022    CALCIUM 8.7 02/22/2022         Significant Diagnostic Studies    Radiology:   CT CHEST ABDOMEN PELVIS W CONTRAST   Final Result   1. Prominent soft tissue hematomas along the left chest.  Correlate for   history of trauma and/or coagulopathy. 2. Moderate/large left pleural effusion with associated left lower lobe and   lingula atelectasis/partial collapse. 3. Scattered ground-glass opacities predominantly in the right upper lobe.    Findings are nonspecific and may be related to edema, contusions, or foci of   inflammation/infection. 4. Fat containing left diaphragmatic hernia. 5. Cirrhotic morphology of the liver with associated portosystemic   collaterals and splenomegaly. 6. Small/moderate ascites. 7. Distended gallbladder with associated wall thickening and pericholecystic   fluid. Findings may be related to ascites, though possibility of   cholecystitis is not excluded in the appropriate clinical setting. 8. Diffuse soft tissue edema. 9. Mild bilateral gynecomastia.                 Consults:     IP CONSULT TO GI  IP CONSULT TO PULMONOLOGY  IP CONSULT TO DIABETES EDUCATOR  IP CONSULT TO GI  IP CONSULT TO SPIRITUAL SERVICES  IP CONSULT TO VASCULAR SURGERY  IP CONSULT TO PALLIATIVE CARE  IP CONSULT TO HEM/ONC  IP CONSULT TO PALLIATIVE CARE  IP CONSULT TO HOSPICE    Disposition:  Inpatient hospice     Condition at Discharge: Terminal    Discharge Instructions/Follow-up:    Follow up with Hospice team    Code Status:  Limited     Activity: activity as tolerated    Diet: regular diet      Discharge Medications:     Current Discharge Medication List           Details   !! insulin lispro (HUMALOG) 100 UNIT/ML injection vial Inject 0-12 Units into the skin 3 times daily (with meals)  Qty: 10 mL, Refills: 3      !! insulin lispro (HUMALOG) 100 UNIT/ML injection vial Inject 0-6 Units into the skin nightly  Qty: 10 mL, Refills: 3      glucagon, rDNA, 1 MG injection Inject 1 mg into the muscle as needed for Low blood sugar (Blood glucose less than 70 mg/dL and patient NOT ALERT or NPO and does not have IV access.)      polyethylene glycol (GLYCOLAX) 17 g packet Take 17 g by mouth daily as needed for Constipation  Qty: 527 g, Refills: 1      insulin glargine (LANTUS) 100 UNIT/ML injection vial Inject 15 Units into the skin nightly  Qty: 10 mL, Refills: 3      pantoprazole (PROTONIX) 40 MG injection Infuse 40 mg intravenously daily      metoprolol tartrate (LOPRESSOR) 25 MG tablet Take 1 tablet by mouth 2 times daily  Qty: 60 tablet, Refills: 3      furosemide (LASIX) 20 MG tablet Take 1 tablet by mouth daily  Qty: 60 tablet, Refills: 3      spironolactone (ALDACTONE) 50 MG tablet Take 1 tablet by mouth daily  Qty: 30 tablet, Refills: 3      phytonadione (VITAMIN K) 5 MG tablet Take 1 tablet by mouth daily for 5 doses  Qty: 1 tablet, Refills: 3      Morphine Sulfate (MORPHINE, PF,) 4 MG/ML injection Infuse 1.5 mLs intravenously every 2 hours as needed for Pain for up to 3 days. Refills: 0    Comments: Reduce doses taken as pain becomes manageable  Associated Diagnoses: Alcoholic cirrhosis, unspecified whether ascites present [facility])       ! ! - Potential duplicate medications found. Please discuss with provider. Details   lactulose (CHRONULAC) 10 GM/15ML solution Take 30 mLs by mouth 3 times daily  Refills: 1              Details   saccharomyces boulardii (FLORASTOR) 250 MG capsule Take 250 mg by mouth 2 times daily      psyllium (METAMUCIL) 58.6 % packet Take 1 packet by mouth daily      Multiple Vitamins-Minerals (THERAPEUTIC MULTIVITAMIN-MINERALS) tablet Take 1 tablet by mouth daily      ARIPiprazole (ABILIFY) 15 MG tablet Take 15 mg by mouth at bedtime      cycloSPORINE (RESTASIS) 0.05 % ophthalmic emulsion 1 drop daily      cetirizine (ZYRTEC) 10 MG tablet Take 10 mg by mouth daily      hydrOXYzine (VISTARIL) 50 MG capsule Take 50 mg by mouth 3 times daily as needed for Itching      FLUoxetine (PROZAC) 20 MG capsule Take 40 mg by mouth daily             Time Spent on discharge is more than 1 hour in the examination, evaluation, counseling and review of medications and discharge plan. Signed:    Ira Cadena MD   2/22/2022      Thank you [address] [place] [address] for the opportunity to be involved in this patient's care. If you have any questions or concerns please feel free to contact me at [phone].

## 2022-02-23 LAB
BLOOD CULTURE, ROUTINE: NORMAL
CULTURE, BLOOD 2: NORMAL
SOLUBLE TRANSFERRIN RECEPT: 7.8 MG/L (ref 2.2–5)

## 2022-03-03 NOTE — PROGRESS NOTES
Physician Progress Note      PATIENT:               Maicol Sosa  CSN #:                  [id number]  :                       1975  ADMIT DATE:       2022 6:20 PM  [address] [place] DATE:        2022 6:31 PM  RESPONDING  PROVIDER #:        Freddie Galindo          QUERY TEXT:    Patient admitted with coagulopathy with hematoma and acute alcoholic   hepatitis, noted increasing wbc, fever  with associated LA 4.3. If   possible, please document in progress notes and discharge summary if you are   evaluating and/or treating any of the following: The medical record reflects the following:    Risk Factors: alcoholic cirrhosis, alcoholic hepatitis, extensive   ecchymosis/hematoma    Clinical Indicators:  WBC 25.4 with bands 13, LA 4.3; Tmax 101.5 per   nursing flowsheets [email or web address], associated tachycardia HR 90s  Zosyn started , subsequent notes say - infectious workup negative; noted   with increased confusion per notes     Treatment: IV Zosyn, imaging and labs, supportive care and monitoring    Thank you,  [email or web address]  Options provided:  -- SIRS of non-infectious origin with acute organ dysfunction  -- SIRS of non-infectious origin without acute organ dysfunction  -- Sepsis of unknown etiology, not POA  -- Other - I will add my own diagnosis  -- Disagree - Not applicable / Not valid  -- Disagree - Clinically unable to determine / Unknown  -- Refer to Clinical Documentation Reviewer    PROVIDER RESPONSE TEXT:    This patient has SIRS of non-infectious origin with acute organ dysfunction. Query created by:  Dom Covington on 3/3/2022 10:20 AM      Electronically signed by:  Freddie Galindo 3/3/2022 6:02 PM

## 2025-01-28 NOTE — PROGRESS NOTES
Hospitalist Progress Note      PCP: Bowen Fleming    Date of Admission: 2/18/2022    Chief Complaint:   Abd and chest wall pain    Hospital Course:     Pt is an 49y.o. year-old male with a history of hypertension and alcoholic cirrhosis of the liver. He presents to the emergency room at the request of his primary care physician for evaluation of abnormal labs. He does not know which labs were abnormal.  He also complains of generalized abdominal pain and left-sided pain. He does have an extensive area of ecchymosis involving his entire left side of his trunk with swelling extending into his left arm. Upon evaluation he was found to have a hemoglobin of 6.9, a platelet count of 47, a large pleural effusion on the left side, abnormal liver function tests and a glucose level of 392. He has no prior history of diabetes mellitus. He is being admitted for further evaluation and treatment. Associated signs and symptoms do not include fever or chills, nausea, vomiting, hemoptysis, hematochezia, diarrhea, constipation or urinary symptoms. Patient diagnosed with EtOH related liver disease in 2021. 2/1/2022 Tbili = 14.5. Patient was admitted on 2/1 for hematoma in chest and coagulopathy. Patient with progressive ecchymoses and pain in left chest over past week. No recent trauma. No melena or BRBPR. No nausea. Patient denies seizure. He was seeing Dr. Antony Goltz as outpatient. 2/20 FFP ordered  2/21 Platelet transfusion ordered. Palliative care consult. Hem/onc consult. 2/22 Seen by palliative care. Hospice consult requested. Subjective:     Patient with more pain in arm and chest wall. Limited PO intake. Palliative care and significant other present. Patient understands severity of illness.      Medications:  Reviewed    Infusion Medications    sodium chloride      sodium chloride      sodium chloride      sodium chloride      dextrose      sodium chloride 100 mL/hr at 02/22/22 0511    sodium chloride  sodium chloride       Scheduled Medications    phytonadione  5 mg Oral Daily    prazosin  2 mg Oral Nightly    metoprolol tartrate  25 mg Oral BID    ARIPiprazole  15 mg Oral Nightly    FLUoxetine  40 mg Oral Daily    therapeutic multivitamin-minerals  1 tablet Oral Daily    furosemide  20 mg Oral Daily    spironolactone  50 mg Oral Daily    piperacillin-tazobactam  3,375 mg IntraVENous Q8H    amLODIPine  10 mg Oral Daily    insulin lispro  0-12 Units SubCUTAneous TID WC    insulin lispro  0-6 Units SubCUTAneous Nightly    sodium chloride flush  10 mL IntraVENous 2 times per day    insulin glargine  0.25 Units/kg SubCUTAneous Nightly    lisinopril  20 mg Oral Daily    pantoprazole  40 mg IntraVENous Daily    lactulose  20 g Oral TID     PRN Meds: morphine, sodium chloride, sodium chloride, hydrOXYzine, sodium chloride, sodium chloride, glucose, glucagon (rDNA), dextrose, sodium chloride flush, sodium chloride, ondansetron, polyethylene glycol, acetaminophen **OR** acetaminophen, dextrose bolus (hypoglycemia) **OR** dextrose bolus (hypoglycemia), sodium chloride, sodium chloride      Intake/Output Summary (Last 24 hours) at 2/22/2022 1416  Last data filed at 2/22/2022 1004  Gross per 24 hour   Intake 960 ml   Output 1325 ml   Net -365 ml       Physical Exam Performed:    BP (!) 111/54   Pulse 63   Temp 98.3 °F (36.8 °C) (Oral)   Resp 20   Ht 5' 8\" (1.727 m)   Wt 223 lb 8 oz (101.4 kg)   SpO2 90%   BMI 33.98 kg/m²     General appearance: Moderate distress, appears stated age and cooperative. Laying in bed. HEENT: Pupils equal, round, and reactive to light. Conjunctivae/corneas clear. Neck: Supple, with full range of motion. No jugular venous distention. Trachea midline. Respiratory:  Normal respiratory effort. Clear to auscultation, bilaterally without Rales/Wheezes/Rhonchi.  Breath sound diminished left chest.  Cardiovascular: Regular rate and rhythm with normal S1/S2 without murmurs, rubs or gallops. Abdomen: Distended and tender. Hypoactive bowel sounds. Musculoskeletal: Edema left arm. Good radial pulse. Sensation and cap refill normal.   Skin: See skin assessment photos. Extensive ecchymoses anterior chest L>R and abdomen. Ecchymoses dependent posterior buttock. No scrotal edema. Edema in left chest and arm. 4+ edema in left arm. Increased in size. Radial pulse palpated. Sensation intact left arm. Neurologic:  Neurovascularly intact without any focal sensory/motor deficits. Cranial nerves: II-XII intact, grossly non-focal.  Psychiatric: Alert and oriented, thought content appropriate, normal insight  Capillary Refill: Brisk,3 seconds, normal   Peripheral Pulses: +2 palpable, equal bilaterally       Labs:   Recent Labs     02/20/22 1200 02/20/22 1200 02/20/22 2118 02/21/22  1214 02/22/22 0440   WBC 25.4*  --   --  18.5* 17.0*   HGB 6.5*   < > 7.6* 6.5* 7.6*   HCT 19.0*   < > 21.8* 18.5* 21.7*   PLT 51*  --   --  25* 44*    < > = values in this interval not displayed. Recent Labs     02/20/22 1200 02/21/22 0448 02/22/22 0440   * 132* 135*   K 4.4 4.4 4.2    98* 101   CO2 17* 21 23   BUN 22* 34* 54*   CREATININE <0.5* <0.5* <0.5*   CALCIUM 9.1 8.9 8.7     Recent Labs     02/20/22 1200 02/21/22 0448 02/22/22  0440   * 142* 142*   ALT 45* 36 34   BILITOT 33.4* 34.2* 35.9*   ALKPHOS 104 81 89     Recent Labs     02/20/22 2118 02/21/22 0448 02/22/22 0440   INR 3.68* 3.28* 2.94*     No results for input(s): CKTOTAL, TROPONINI in the last 72 hours. Urinalysis:      Lab Results   Component Value Date    NITRU Negative 02/18/2022    WBCUA 0-2 02/18/2022    RBCUA 0-2 02/18/2022    BLOODU TRACE-INTACT 02/18/2022    SPECGRAV 1.015 02/18/2022    GLUCOSEU >=1000 02/18/2022       Radiology:  CT CHEST ABDOMEN PELVIS W CONTRAST   Final Result   1. Prominent soft tissue hematomas along the left chest.  Correlate for   history of trauma and/or coagulopathy.    2. Moderate/large left pleural effusion with associated left lower lobe and   lingula atelectasis/partial collapse. 3. Scattered ground-glass opacities predominantly in the right upper lobe. Findings are nonspecific and may be related to edema, contusions, or foci of   inflammation/infection. 4. Fat containing left diaphragmatic hernia. 5. Cirrhotic morphology of the liver with associated portosystemic   collaterals and splenomegaly. 6. Small/moderate ascites. 7. Distended gallbladder with associated wall thickening and pericholecystic   fluid. Findings may be related to ascites, though possibility of   cholecystitis is not excluded in the appropriate clinical setting. 8. Diffuse soft tissue edema. 9. Mild bilateral gynecomastia. Assessment/Plan:    Active Hospital Problems    Diagnosis     Hematoma of left chest wall [K29.957Y]     Alcoholic cirrhosis of liver (HCC) [K70.30]     Essential hypertension [I10]     Coagulopathy ([address] [D68.9]     Thrombocytopenia ([address] [D69.6]     Splenomegaly [R16.1]     New onset type 2 diabetes mellitus ([address] [E11.9]     Transaminitis [R74.01]     Hyperbilirubinemia [E80.6]     Elevated alkaline phosphatase level [R74.8]     Pleural effusion on left [J90]     Hypoalbuminemia [E88.09]     Ascites due to alcoholic cirrhosis (HCC) [N81.40]     Hyperammonemia (HCC) [E72.20]     Alcohol abuse [F10.10]     Portal hypertension ([address] [K76.6]     Gynecomastia, male [N62]     Compression atelectasis [J98.11]     Elevated lipase [R74.8]     Class 2 obesity in adult [E66.9]     Suspected sleep apnea [R29.818]     Acute anemia [D64.9]      1. Anemia - Secondary to blood loss. Subcutaneous hematoma and possible GI bleed. Consulted GI. Unable to fully correct coagulopathy. 2. Coagulopathy secondary to liver disease - Monitor INR. Vit K given. FFP given. Monitor INR. Platelets low due to splenomegaly. Transfuse platelets.  Consult hem/onc to assist with coagulopathy. 3. Alcohol related liver cirrhosis - Minimal ascites. GI consulted. Monitor ammonia level. MELD score 34. Prognosis terminal. Not a liver transplant candidate due to EtOH use. 4. Left pleural effusion - Pulmonary consulted. Unable to have thoracentesis due to coagulopathy. 5. Thrombocytopenia - Secondary to liver disease. Splenomegaly noted. Transfuse plts due to active bleed. 6. Left arm and chest hematoma - No compartment syndrome clinically. Appreciate vascular surgery consult. Poor prognosis MELD score 34. Palliative care following. Requesting hospice evaluation. DVT Prophylaxis: Coagulopathy  Diet: ADULT DIET; Regular; 5 carb choices (75 gm/meal); Low Sodium (2 gm)  ADULT ORAL NUTRITION SUPPLEMENT; Lunch, Dinner, Breakfast; Diabetic Oral Supplement  Code Status: Limited    PT/OT Eval Status: Acutely ill    Dispo - Acutely ill. Poor prognosis. Plan for [facility] at [facility]?     Hussein Soria MD [employer]